# Patient Record
Sex: MALE | Race: WHITE | NOT HISPANIC OR LATINO
[De-identification: names, ages, dates, MRNs, and addresses within clinical notes are randomized per-mention and may not be internally consistent; named-entity substitution may affect disease eponyms.]

---

## 2022-11-22 ENCOUNTER — TRANSCRIBE ORDERS (OUTPATIENT)
Dept: OTHER | Age: 67
End: 2022-11-22

## 2022-11-22 DIAGNOSIS — D86.9 SARCOIDOSIS: Primary | ICD-10-CM

## 2022-12-02 DIAGNOSIS — J84.9 ILD (INTERSTITIAL LUNG DISEASE) (H): Primary | ICD-10-CM

## 2023-02-17 ENCOUNTER — LAB REQUISITION (OUTPATIENT)
Dept: LAB | Facility: CLINIC | Age: 68
End: 2023-02-17
Payer: COMMERCIAL

## 2023-02-17 LAB
PATH REPORT.COMMENTS IMP SPEC: NORMAL
PATH REPORT.FINAL DX SPEC: NORMAL
PATH REPORT.GROSS SPEC: NORMAL
PATH REPORT.RELEVANT HX SPEC: NORMAL
PATH REPORT.RELEVANT HX SPEC: NORMAL
PATH REPORT.SITE OF ORIGIN SPEC: NORMAL

## 2023-02-17 PROCEDURE — 88321 CONSLTJ&REPRT SLD PREP ELSWR: CPT | Performed by: PATHOLOGY

## 2023-02-22 ENCOUNTER — OFFICE VISIT (OUTPATIENT)
Dept: PULMONOLOGY | Facility: CLINIC | Age: 68
End: 2023-02-22
Attending: INTERNAL MEDICINE
Payer: COMMERCIAL

## 2023-02-22 ENCOUNTER — PATIENT OUTREACH (OUTPATIENT)
Dept: PULMONOLOGY | Facility: CLINIC | Age: 68
End: 2023-02-22

## 2023-02-22 ENCOUNTER — ANCILLARY PROCEDURE (OUTPATIENT)
Dept: CT IMAGING | Facility: CLINIC | Age: 68
End: 2023-02-22
Attending: INTERNAL MEDICINE
Payer: COMMERCIAL

## 2023-02-22 ENCOUNTER — LAB (OUTPATIENT)
Dept: LAB | Facility: CLINIC | Age: 68
End: 2023-02-22
Payer: COMMERCIAL

## 2023-02-22 VITALS
DIASTOLIC BLOOD PRESSURE: 80 MMHG | OXYGEN SATURATION: 97 % | HEART RATE: 88 BPM | HEIGHT: 73 IN | WEIGHT: 187 LBS | SYSTOLIC BLOOD PRESSURE: 135 MMHG | BODY MASS INDEX: 24.78 KG/M2

## 2023-02-22 DIAGNOSIS — J84.9 ILD (INTERSTITIAL LUNG DISEASE) (H): ICD-10-CM

## 2023-02-22 DIAGNOSIS — D86.9 SARCOIDOSIS: Primary | ICD-10-CM

## 2023-02-22 DIAGNOSIS — D86.9 SARCOIDOSIS: ICD-10-CM

## 2023-02-22 LAB
6 MIN WALK (FT): 1410 FT
6 MIN WALK (M): 430 M
ALBUMIN SERPL BCG-MCNC: 4.4 G/DL (ref 3.5–5.2)
ALP SERPL-CCNC: 113 U/L (ref 40–129)
ALT SERPL W P-5'-P-CCNC: 22 U/L (ref 10–50)
ANION GAP SERPL CALCULATED.3IONS-SCNC: 11 MMOL/L (ref 7–15)
AST SERPL W P-5'-P-CCNC: 26 U/L (ref 10–50)
BASOPHILS # BLD AUTO: 0.1 10E3/UL (ref 0–0.2)
BASOPHILS NFR BLD AUTO: 0 %
BILIRUB SERPL-MCNC: 0.7 MG/DL
BUN SERPL-MCNC: 15 MG/DL (ref 8–23)
CALCIUM SERPL-MCNC: 9.7 MG/DL (ref 8.8–10.2)
CHLORIDE SERPL-SCNC: 102 MMOL/L (ref 98–107)
CREAT SERPL-MCNC: 1.13 MG/DL (ref 0.67–1.17)
CRP SERPL-MCNC: 13.6 MG/L
DEPRECATED HCO3 PLAS-SCNC: 24 MMOL/L (ref 22–29)
EOSINOPHIL # BLD AUTO: 0 10E3/UL (ref 0–0.7)
EOSINOPHIL NFR BLD AUTO: 0 %
ERYTHROCYTE [DISTWIDTH] IN BLOOD BY AUTOMATED COUNT: 14.5 % (ref 10–15)
GFR SERPL CREATININE-BSD FRML MDRD: 72 ML/MIN/1.73M2
GLUCOSE SERPL-MCNC: 111 MG/DL (ref 70–99)
HCT VFR BLD AUTO: 41.2 % (ref 40–53)
HGB BLD-MCNC: 13.9 G/DL (ref 13.3–17.7)
IMM GRANULOCYTES # BLD: 0.1 10E3/UL
IMM GRANULOCYTES NFR BLD: 1 %
LYMPHOCYTES # BLD AUTO: 0.4 10E3/UL (ref 0.8–5.3)
LYMPHOCYTES NFR BLD AUTO: 3 %
MCH RBC QN AUTO: 29.1 PG (ref 26.5–33)
MCHC RBC AUTO-ENTMCNC: 33.7 G/DL (ref 31.5–36.5)
MCV RBC AUTO: 86 FL (ref 78–100)
MONOCYTES # BLD AUTO: 0.4 10E3/UL (ref 0–1.3)
MONOCYTES NFR BLD AUTO: 3 %
NEUTROPHILS # BLD AUTO: 10.5 10E3/UL (ref 1.6–8.3)
NEUTROPHILS NFR BLD AUTO: 93 %
NRBC # BLD AUTO: 0 10E3/UL
NRBC BLD AUTO-RTO: 0 /100
PLATELET # BLD AUTO: 288 10E3/UL (ref 150–450)
POTASSIUM SERPL-SCNC: 4.5 MMOL/L (ref 3.4–5.3)
PROT SERPL-MCNC: 7.9 G/DL (ref 6.4–8.3)
PTH-INTACT SERPL-MCNC: 62 PG/ML (ref 15–65)
RBC # BLD AUTO: 4.77 10E6/UL (ref 4.4–5.9)
SODIUM SERPL-SCNC: 137 MMOL/L (ref 136–145)
WBC # BLD AUTO: 11.3 10E3/UL (ref 4–11)

## 2023-02-22 PROCEDURE — 99213 OFFICE O/P EST LOW 20 MIN: CPT | Performed by: INTERNAL MEDICINE

## 2023-02-22 PROCEDURE — 85025 COMPLETE CBC W/AUTO DIFF WBC: CPT | Performed by: PATHOLOGY

## 2023-02-22 PROCEDURE — 86140 C-REACTIVE PROTEIN: CPT | Performed by: PATHOLOGY

## 2023-02-22 PROCEDURE — 94726 PLETHYSMOGRAPHY LUNG VOLUMES: CPT | Performed by: INTERNAL MEDICINE

## 2023-02-22 PROCEDURE — 94375 RESPIRATORY FLOW VOLUME LOOP: CPT | Performed by: INTERNAL MEDICINE

## 2023-02-22 PROCEDURE — 94729 DIFFUSING CAPACITY: CPT | Performed by: INTERNAL MEDICINE

## 2023-02-22 PROCEDURE — 80053 COMPREHEN METABOLIC PANEL: CPT | Performed by: PATHOLOGY

## 2023-02-22 PROCEDURE — 86481 TB AG RESPONSE T-CELL SUSP: CPT | Performed by: PATHOLOGY

## 2023-02-22 PROCEDURE — 86612 BLASTOMYCES ANTIBODY: CPT | Mod: 90 | Performed by: PATHOLOGY

## 2023-02-22 PROCEDURE — 87385 HISTOPLASMA CAPSUL AG IA: CPT | Mod: 90 | Performed by: PATHOLOGY

## 2023-02-22 PROCEDURE — 86635 COCCIDIOIDES ANTIBODY: CPT | Mod: 90 | Performed by: PATHOLOGY

## 2023-02-22 PROCEDURE — 99204 OFFICE O/P NEW MOD 45 MIN: CPT | Mod: 25 | Performed by: INTERNAL MEDICINE

## 2023-02-22 PROCEDURE — 86698 HISTOPLASMA ANTIBODY: CPT | Mod: 90 | Performed by: PATHOLOGY

## 2023-02-22 PROCEDURE — 82306 VITAMIN D 25 HYDROXY: CPT | Performed by: PATHOLOGY

## 2023-02-22 PROCEDURE — 83520 IMMUNOASSAY QUANT NOS NONAB: CPT | Mod: 90 | Performed by: PATHOLOGY

## 2023-02-22 PROCEDURE — 71250 CT THORAX DX C-: CPT | Performed by: RADIOLOGY

## 2023-02-22 PROCEDURE — 99000 SPECIMEN HANDLING OFFICE-LAB: CPT | Performed by: PATHOLOGY

## 2023-02-22 PROCEDURE — 94618 PULMONARY STRESS TESTING: CPT | Performed by: INTERNAL MEDICINE

## 2023-02-22 PROCEDURE — 82164 ANGIOTENSIN I ENZYME TEST: CPT | Mod: 90 | Performed by: PATHOLOGY

## 2023-02-22 PROCEDURE — G0463 HOSPITAL OUTPT CLINIC VISIT: HCPCS | Performed by: INTERNAL MEDICINE

## 2023-02-22 PROCEDURE — 86606 ASPERGILLUS ANTIBODY: CPT | Mod: 90 | Performed by: PATHOLOGY

## 2023-02-22 PROCEDURE — 83970 ASSAY OF PARATHORMONE: CPT | Performed by: PATHOLOGY

## 2023-02-22 PROCEDURE — 82652 VIT D 1 25-DIHYDROXY: CPT | Performed by: PATHOLOGY

## 2023-02-22 PROCEDURE — 36415 COLL VENOUS BLD VENIPUNCTURE: CPT | Performed by: PATHOLOGY

## 2023-02-22 PROCEDURE — 84433 ASY THIOPURIN S-MTHYLTRNSFRS: CPT | Mod: 90 | Performed by: PATHOLOGY

## 2023-02-22 PROCEDURE — 82784 ASSAY IGA/IGD/IGG/IGM EACH: CPT | Performed by: PATHOLOGY

## 2023-02-22 RX ORDER — TIOTROPIUM BROMIDE AND OLODATEROL 3.124; 2.736 UG/1; UG/1
SPRAY, METERED RESPIRATORY (INHALATION)
COMMUNITY
Start: 2023-02-17

## 2023-02-22 RX ORDER — FLUOXETINE 40 MG/1
CAPSULE ORAL
COMMUNITY
Start: 2023-02-11

## 2023-02-22 RX ORDER — ALBUTEROL SULFATE 90 UG/1
2 AEROSOL, METERED RESPIRATORY (INHALATION)
COMMUNITY
Start: 2021-06-29 | End: 2023-05-31

## 2023-02-22 RX ORDER — CALCIUM CARBONATE/VITAMIN D3 600 MG-10
1 TABLET ORAL DAILY
COMMUNITY

## 2023-02-22 RX ORDER — SIMVASTATIN 40 MG
1 TABLET ORAL
COMMUNITY
Start: 2023-02-11

## 2023-02-22 RX ORDER — PREDNISONE 5 MG/1
1 TABLET ORAL
COMMUNITY
Start: 2022-12-29 | End: 2023-09-27

## 2023-02-22 ASSESSMENT — PAIN SCALES - GENERAL: PAINLEVEL: NO PAIN (0)

## 2023-02-22 NOTE — LETTER
2/22/2023         RE: Jose Carlos Tello  [id number] [facility] [id number]        Dear Colleague,    Thank you for referring your patient, Jose Carlos Tello, to the [facility] [place]. Please see a copy of my visit note below.    Reason for Visit  Jose Carlos Tello is a 67 year old year old male who is being seen for sarcoidosis  Pulmonary HPI    The patient was seen and examined by Naveed Verde MD     Mr. Tello comes in for initial evaluation for sarcoidosis.  Around 07/2021, he started having worsening cough and decreasing endurance.  He also had shortness of breath.  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  Ultimately, he discussed this with his primary care physician and a workup was completed with chest x-ray and a CAT scan.  This was followed by a PET scan.  A bronchoscopy was done with EBUS-TBNA.  A diagnosis of sarcoidosis was made.  He had evaluations done by Cardiology, Urology, Nephrology and Ophthalmology at the initial diagnosis.  He was placed on prednisone 60 mg per day and this was slowly tapered.  Methotrexate was added at around 3 or so months back.  As he continues to have decreased endurance, other options for treatment were considered and this referral was placed.  He is currently on prednisone 5 mg per day and methotrexate 15 mg per week.    He tells me that he continues to have some shortness of breath with running, even at a slow pace.  He can walk on a level surface without any problems.  He can go up 1 flight of stairs without any significant shortness of breath but now, 2-3 flights can bother him.  The cough which was present initially has significantly improved.  He reports that he had more energy when he was on prednisone 60 mg per day but does not remember if his endurance was significantly better.    Of note,  he has right bundle branch block and he was evaluated by Cardiology.  No further testing was done.    PAST MEDICAL HISTORY:  Includes sarcoidosis, hypercholesterolemia and anxiety.    CURRENT MEDICATIONS:  Include:    1.  Prednisone 5 mg per day.    2.  Methotrexate 15 mg per week.    3.  MVI.    4.  Calcium/vitamin D.   5.  Fluoxetine.    6.  Albuterol inhaler.    7.  He is also on tiotropium inhaler.    SOCIAL HISTORY:  He worked in a [employer] and was exposed to solvents and other chemicals for 40 years.  Currently, retired.  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  He has no pets or birds at home.  He does not use a hot tub.  He is a lifelong nonsmoker and had occasional alcohol which he has stopped taking since he was started on methotrexate.  No exposure to TB or asbestos.    He is of Polish/[place]atian/Maced[place] ancestry.  He has 10 siblings, none of whom have sarcoidosis.  His children are healthy without any lung disease or sarcoidosis.      ROS Pulmonary    A complete ROS was otherwise negative except as noted in the HPI.  There were no vitals taken for this visit.  Exam:   GENERAL APPEARANCE: Well developed, well nourished, alert, and in no apparent distress.  LYMPHATICS: No significant axillary, cervical, or supraclavicular nodes.  RESP: normal percussion, good air flow throughout.  No crackles. No rhonchi. No wheezes.  CV: Normal S1, S2, regular rhythm, normal rate. No murmur.  No rub. No gallop. No LE edema.   MS: extremities normal. No clubbing. No cyanosis.  SKIN: no rash on limited exam  NEURO: Mentation intact, speech normal, normal strength and tone, normal gait and stance    Results:  Chest CT: Perihilar soft tissue thickening/dense fibrosis/conglomerate partially calcified lymph nodes grossly unchanged with numerous small sub-5 mm nodules peripheral to the perihilar fullness areas along with right middle lobe consolidation with narrowing and abrupt termination again noted in the  right middle lobe bronchus. These findings are similar to previous consistent with sarcoidosis.    PFTs done today were reviewed by me with the patient.  A 6 minute walk test was also done, which showed a decreased 6 minute walk distance at 1410 feet (lower limit of normal equal to 1632 feet).  O2 saturation at the beginning of the walk was 100%, lowest O2 saturation 92%.  FVC is 5.16 (113%), Z score 0.95.  FEV1 is 3.52 (101%), Z score 0.11.  The ratio is 68, and FEF 25-75 is 2.28 L (83%), Z score negative 0.43.  Total lung capacity is 8.50 (109%), Z score 1.02.  Residual volume is 3.06 (115%), and Z score 0.99.  DLCO not corrected for hemoglobin is 23.08 (78%), negative 1.34.      My interpretation is that he has exercise limitation based on 6 minute walk distance and 8 point drop in O2 saturation with activity.  Suggestion of obstruction and mild air trapping, but otherwise spirometry and lung volumes are normal.  The diffusion capacity not corrected for hemoglobin is mildly decreased.    Note- on Biopsies obtained on 7/13/2021, no granulomas were identified on cellblock to performs AFB, GMS and aurimine stains.      Assessment and plan: A 67-year-old male of Polish/British/Romansh descent with no family history of sarcoidosis, exposed to solvents for 40 years at a [employer] with initial symptoms in 2021, ultimately leading to EBUS-TBNA that showed features suggestive of granuloma.  Fungal and AFB stains not done on that tissue.  He was treated with prednisone and methotrexate.  He is here for further evaluation and management.    1.  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  There also is concern for airway involvement.  It is unclear if he had a clear benefit  with higher levels of immunosuppression in the pas. He reports better ennergy but not sure if  his endurance was better.  The options for pulmonary sarcoidosis include a trial with TNF blockade for a finite amount of time or  consider a clinical study.  Because he continues to have lower endurance, other factors that may be contributing need to be considered. He could have pulmonary hypertension for which we will get an echo with evaluation of RVSP. This will include a bubble study. He could certainly have a significant component of airway involvement. This would be hard to resolve and treatment could include inhaled agents, as he is on. Stenting would probably not be an option in a benign condition.  However, dilatation could be considered. We will review his case in Radiology meeting to see if we should have direct visualization by IP to consider dilatation.    2.  Extrapulmonary sarcoidosis:  Has a history of right bundle branch block.  He has not had any advanced cardiac imaging study.  A PET scan would be reasonable because it will show uptake in other areas also to indicate activity of his sarcoidosis.  We will order one.  We will check LFTs, calcium, vitamin D, PTH and dihydroxy vitamin D.  We will check CBC.  He has had an eye exam and no ocular inflammation was present.    He is currently not on PJP prophylaxis which could be considered given that he is on methotrexate and prednisone.    I will see him back in 3 months and will review the treatment options with him, in the meantime, over the phone.       Addendum: Echo and cardiac PET scan scheduled for 3/13/2023 and 3/15/23 respectively.  Labs reviewed.  Electrolyte panel in normal range.  Calcium is 9.7, vitamin D 26,, 125 dihydroxy vitamin D 41.9.  CRP is 13.6.  LFTs are in the normal range.  WBC count 11.3 with 93% neutrophils.  Hemoglobin is 13.9 and platelet count is 288..  Fungal antibodies are negative.  QuantiFERON gold TB test is negative.  IgG levels are in the normal range.  PTH is in the normal range.  Histo antigen is negative.  Soluble IL-2 R is in the normal range history of cocci antibodies are in the normal range.    We are waiting for the echo and the PET  study.  Based on the finding of the studies and review of his case during MDD we will need to decide if direct evaluation for possible medication of the lobar airways is the next versus reinitiating systemic anti-inflammatory therapy should be considered.      Again, thank you for allowing me to participate in the care of your patient.        Sincerely,        Naveed Verde MD

## 2023-02-22 NOTE — NURSING NOTE
Chief Complaint   Patient presents with     New Patient     New ILD      Vitals were taken and medications were reconciled.     Maggie Monique RMA  10:56 AM

## 2023-02-22 NOTE — PROGRESS NOTES
Spoke with patient after new consult visit with provider to introduce role as nurse care coordinator and provide direct phone number for future questions or concerns related to their care received in ILD/Sarcoidosis Clinic. Also provided contact information for ILD/Sarcoidosis Clinic Navigator for use related to scheduling needs

## 2023-02-22 NOTE — PROGRESS NOTES
Reason for Visit  Jose Carlos Tello is a 67 year old year old male who is being seen for sarcoidosis  Pulmonary HPI    The patient was seen and examined by Naveed Verde MD     Mr. Tello comes in for initial evaluation for sarcoidosis.  Around 07/2021, he started having worsening cough and decreasing endurance.  He also had shortness of breath.  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  Ultimately, he discussed this with his primary care physician and a workup was completed with chest x-ray and a CAT scan.  This was followed by a PET scan.  A bronchoscopy was done with EBUS-TBNA.  A diagnosis of sarcoidosis was made.  He had evaluations done by Cardiology, Urology, Nephrology and Ophthalmology at the initial diagnosis.  He was placed on prednisone 60 mg per day and this was slowly tapered.  Methotrexate was added at around 3 or so months back.  As he continues to have decreased endurance, other options for treatment were considered and this referral was placed.  He is currently on prednisone 5 mg per day and methotrexate 15 mg per week.    He tells me that he continues to have some shortness of breath with running, even at a slow pace.  He can walk on a level surface without any problems.  He can go up 1 flight of stairs without any significant shortness of breath but now, 2-3 flights can bother him.  The cough which was present initially has significantly improved.  He reports that he had more energy when he was on prednisone 60 mg per day but does not remember if his endurance was significantly better.    Of note, he has right bundle branch block and he was evaluated by Cardiology.  No further testing was done.    PAST MEDICAL HISTORY:  Includes sarcoidosis, hypercholesterolemia and anxiety.    CURRENT MEDICATIONS:  Include:    1.  Prednisone 5 mg per day.    2.  Methotrexate 15 mg per week.    3.  MVI.     4.  Calcium/vitamin D.   5.  Fluoxetine.    6.  Albuterol inhaler.    7.  He is also on tiotropium inhaler.    SOCIAL HISTORY:  He worked in a [employer] and was exposed to solvents and other chemicals for 40 years.  Currently, retired.  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  He has no pets or birds at home.  He does not use a hot tub.  He is a lifelong nonsmoker and had occasional alcohol which he has stopped taking since he was started on methotrexate.  No exposure to TB or asbestos.    He is of Polish/Frisian/[place] ancestry.  He has 10 siblings, none of whom have sarcoidosis.  His children are healthy without any lung disease or sarcoidosis.      ROS Pulmonary    A complete ROS was otherwise negative except as noted in the HPI.  There were no vitals taken for this visit.  Exam:   GENERAL APPEARANCE: Well developed, well nourished, alert, and in no apparent distress.  LYMPHATICS: No significant axillary, cervical, or supraclavicular nodes.  RESP: normal percussion, good air flow throughout.  No crackles. No rhonchi. No wheezes.  CV: Normal S1, S2, regular rhythm, normal rate. No murmur.  No rub. No gallop. No LE edema.   MS: extremities normal. No clubbing. No cyanosis.  SKIN: no rash on limited exam  NEURO: Mentation intact, speech normal, normal strength and tone, normal gait and stance    Results:  Chest CT: Perihilar soft tissue thickening/dense fibrosis/conglomerate partially calcified lymph nodes grossly unchanged with numerous small sub-5 mm nodules peripheral to the perihilar fullness areas along with right middle lobe consolidation with narrowing and abrupt termination again noted in the right middle lobe bronchus. These findings are similar to previous consistent with sarcoidosis.    PFTs done today were reviewed by me with the patient.  A 6 minute walk test was also done, which showed a decreased 6 minute walk distance at 1410 feet (lower limit of normal equal to 1632 feet).  O2  saturation at the beginning of the walk was 100%, lowest O2 saturation 92%.  FVC is 5.16 (113%), Z score 0.95.  FEV1 is 3.52 (101%), Z score 0.11.  The ratio is 68, and FEF 25-75 is 2.28 L (83%), Z score negative 0.43.  Total lung capacity is 8.50 (109%), Z score 1.02.  Residual volume is 3.06 (115%), and Z score 0.99.  DLCO not corrected for hemoglobin is 23.08 (78%), negative 1.34.      My interpretation is that he has exercise limitation based on 6 minute walk distance and 8 point drop in O2 saturation with activity.  Suggestion of obstruction and mild air trapping, but otherwise spirometry and lung volumes are normal.  The diffusion capacity not corrected for hemoglobin is mildly decreased.    Note- on Biopsies obtained on 7/13/2021, no granulomas were identified on cellblock to performs AFB, GMS and aurimine stains.      Assessment and plan: A 67-year-old male of Polish/Korean/Japanese descent with no family history of sarcoidosis, exposed to solvents for 40 years at a [employer] with initial symptoms in 2021, ultimately leading to EBUS-TBNA that showed features suggestive of granuloma.  Fungal and AFB stains not done on that tissue.  He was treated with prednisone and methotrexate.  He is here for further evaluation and management.    1.  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  There also is concern for airway involvement.  It is unclear if he had a clear benefit  with higher levels of immunosuppression in the pas. He reports better ennergy but not sure if  his endurance was better.  The options for pulmonary sarcoidosis include a trial with TNF blockade for a finite amount of time or consider a clinical study.  Because he continues to have lower endurance, other factors that may be contributing need to be considered. He could have pulmonary hypertension for which we will get an echo with evaluation of RVSP. This will include a bubble study. He could certainly have a significant  component of airway involvement. This would be hard to resolve and treatment could include inhaled agents, as he is on. Stenting would probably not be an option in a benign condition.  However, dilatation could be considered. We will review his case in Radiology meeting to see if we should have direct visualization by IP to consider dilatation.    2.  Extrapulmonary sarcoidosis:  Has a history of right bundle branch block.  He has not had any advanced cardiac imaging study.  A PET scan would be reasonable because it will show uptake in other areas also to indicate activity of his sarcoidosis.  We will order one.  We will check LFTs, calcium, vitamin D, PTH and dihydroxy vitamin D.  We will check CBC.  He has had an eye exam and no ocular inflammation was present.    He is currently not on PJP prophylaxis which could be considered given that he is on methotrexate and prednisone.    I will see him back in 3 months and will review the treatment options with him, in the meantime, over the phone.       Addendum: Echo and cardiac PET scan scheduled for 3/13/2023 and 3/15/23 respectively.  Labs reviewed.  Electrolyte panel in normal range.  Calcium is 9.7, vitamin D 26,, 125 dihydroxy vitamin D 41.9.  CRP is 13.6.  LFTs are in the normal range.  WBC count 11.3 with 93% neutrophils.  Hemoglobin is 13.9 and platelet count is 288..  Fungal antibodies are negative.  QuantiFERON gold TB test is negative.  IgG levels are in the normal range.  PTH is in the normal range.  Histo antigen is negative.  Soluble IL-2 R is in the normal range history of cocci antibodies are in the normal range.    We are waiting for the echo and the PET study.  Based on the finding of the studies and review of his case during MDD we will need to decide if direct evaluation for possible medication of the lobar airways is the next versus reinitiating systemic anti-inflammatory therapy should be considered.

## 2023-02-23 LAB
1,25(OH)2D SERPL-MCNC: 41.9 PG/ML (ref 19.9–79.3)
DEPRECATED CALCIDIOL+CALCIFEROL SERPL-MC: 26 UG/L (ref 20–75)
DLCOUNC-%PRED-PRE: 78 %
DLCOUNC-PRE: 23.08 ML/MIN/MMHG
DLCOUNC-PRED: 29.32 ML/MIN/MMHG
ERV-%PRED-PRE: 65 %
ERV-PRE: 1 L
ERV-PRED: 1.53 L
EXPTIME-PRE: 7.18 SEC
FEF2575-%PRED-PRE: 83 %
FEF2575-PRE: 2.28 L/SEC
FEF2575-PRED: 2.72 L/SEC
FEFMAX-%PRED-PRE: 82 %
FEFMAX-PRE: 7.81 L/SEC
FEFMAX-PRED: 9.49 L/SEC
FEV1-%PRED-PRE: 101 %
FEV1-PRE: 3.52 L
FEV1FEV6-PRE: 70 %
FEV1FEV6-PRED: 78 %
FEV1FVC-PRE: 68 %
FEV1FVC-PRED: 77 %
FEV1SVC-PRE: 65 %
FEV1SVC-PRED: 64 %
FIFMAX-PRE: 3.87 L/SEC
FRCPLETH-%PRED-PRE: 105 %
FRCPLETH-PRE: 4.07 L
FRCPLETH-PRED: 3.86 L
FVC-%PRED-PRE: 113 %
FVC-PRE: 5.16 L
FVC-PRED: 4.53 L
GAMMA INTERFERON BACKGROUND BLD IA-ACNC: 0.01 IU/ML
IC-%PRED-PRE: 113 %
IC-PRE: 4.44 L
IC-PRED: 3.9 L
IGG SERPL-MCNC: 1270 MG/DL (ref 610–1616)
M TB IFN-G BLD-IMP: NEGATIVE
M TB IFN-G CD4+ BCKGRND COR BLD-ACNC: 1.6 IU/ML
MITOGEN IGNF BCKGRD COR BLD-ACNC: 0 IU/ML
MITOGEN IGNF BCKGRD COR BLD-ACNC: 0 IU/ML
QUANTIFERON MITOGEN: 1.61 IU/ML
QUANTIFERON NIL TUBE: 0.01 IU/ML
QUANTIFERON TB1 TUBE: 0.01 IU/ML
QUANTIFERON TB2 TUBE: 0.01
RVPLETH-%PRED-PRE: 115 %
RVPLETH-PRE: 3.06 L
RVPLETH-PRED: 2.66 L
TLCPLETH-%PRED-PRE: 109 %
TLCPLETH-PRE: 8.5 L
TLCPLETH-PRED: 7.79 L
VA-%PRED-PRE: 105 %
VA-PRE: 7.64 L
VC-%PRED-PRE: 100 %
VC-PRE: 5.44 L
VC-PRED: 5.42 L

## 2023-02-24 LAB
ACE SERPL-CCNC: 47 U/L
SCANNED LAB RESULT: NORMAL

## 2023-02-25 LAB
ASPERGILLUS AB TITR SER CF: NORMAL {TITER}
B DERMAT AB SER-ACNC: 0.6 IV
COCCIDIOIDES AB TITR SER CF: NORMAL {TITER}
H CAPSUL MYC AB TITR SER CF: NORMAL {TITER}
H CAPSUL YST AB TITR SER CF: NORMAL {TITER}
TPMT BLD-CCNC: 32 U/ML

## 2023-03-01 LAB — SCANNED LAB RESULT: NORMAL

## 2023-03-13 ENCOUNTER — ANCILLARY PROCEDURE (OUTPATIENT)
Dept: CARDIOLOGY | Facility: CLINIC | Age: 68
End: 2023-03-13
Attending: INTERNAL MEDICINE
Payer: COMMERCIAL

## 2023-03-13 LAB — LVEF ECHO: NORMAL

## 2023-03-13 PROCEDURE — 93306 TTE W/DOPPLER COMPLETE: CPT | Performed by: STUDENT IN AN ORGANIZED HEALTH CARE EDUCATION/TRAINING PROGRAM

## 2023-03-14 ENCOUNTER — TEAM CONFERENCE (OUTPATIENT)
Dept: PULMONOLOGY | Facility: CLINIC | Age: 68
End: 2023-03-14
Payer: COMMERCIAL

## 2023-03-14 DIAGNOSIS — D86.9 SARCOIDOSIS: Primary | ICD-10-CM

## 2023-03-14 NOTE — CONFIDENTIAL NOTE
Sarcoid Multidisciplinary Conference      Patient name: Jose Carlos Tello    Physician: Naveed Verde MD (pulm)    Question for multidisciplinary group: Pt Seeking second opinion. Significant airway involvement? Review bx to determine if consistent with sarcoid. Imaging showing better or worse disease?    Radiology interpretation: 2/2023 CT scan: Significant airway narrowing. RUL and RLL are the worst. PET Imaging was worse in 6/2021 in comparison to 2/23 CT. No dramatic change in last 2 years. Consistent with sarcoidosis. Jose Carlos Naylor MD    Other testing reviewed: EBUS/TBNA: Lymph node path shows tiny granulomas; too small to diagnose. Due to limited tissue sampling, biopsy is inconclusive. Mell Hope MD    Plan: 2nd PET will be done 3/15/23.  Echo shows RVS abnormal at 30. Will follow clinically, possible right heart cath in the future.     3/16/23: Spoke with pt and per Dr. Verde pt being advised to start Infliximab. Pt states he is willing to start. Orders placed to secure finance approval. Awaiting response from Dr. Obando on airways dilatation. Awaiting response from Dr. Villalpando in relation to PET showing global reduced perfusion.     Alissa Mascorro RN

## 2023-03-15 ENCOUNTER — ANCILLARY PROCEDURE (OUTPATIENT)
Dept: PET IMAGING | Facility: CLINIC | Age: 68
End: 2023-03-15
Attending: INTERNAL MEDICINE

## 2023-03-15 DIAGNOSIS — D86.9 SARCOIDOSIS: ICD-10-CM

## 2023-03-15 LAB — GLUCOSE SERPL-MCNC: 95 MG/DL (ref 70–99)

## 2023-03-16 DIAGNOSIS — D86.9 SARCOIDOSIS: ICD-10-CM

## 2023-03-16 RX ORDER — MEPERIDINE HYDROCHLORIDE 25 MG/ML
25 INJECTION INTRAMUSCULAR; INTRAVENOUS; SUBCUTANEOUS EVERY 30 MIN PRN
Status: CANCELLED | OUTPATIENT
Start: 2023-03-16

## 2023-03-16 RX ORDER — ACETAMINOPHEN 325 MG/1
650 TABLET ORAL ONCE
Status: CANCELLED
Start: 2023-03-16 | End: 2023-03-16

## 2023-03-16 RX ORDER — HEPARIN SODIUM (PORCINE) LOCK FLUSH IV SOLN 100 UNIT/ML 100 UNIT/ML
5 SOLUTION INTRAVENOUS
Status: CANCELLED | OUTPATIENT
Start: 2023-03-16

## 2023-03-16 RX ORDER — EPINEPHRINE 1 MG/ML
0.3 INJECTION, SOLUTION, CONCENTRATE INTRAVENOUS EVERY 5 MIN PRN
Status: CANCELLED | OUTPATIENT
Start: 2023-03-16

## 2023-03-16 RX ORDER — DIPHENHYDRAMINE HYDROCHLORIDE 50 MG/ML
50 INJECTION INTRAMUSCULAR; INTRAVENOUS
Status: CANCELLED
Start: 2023-03-16

## 2023-03-16 RX ORDER — HEPARIN SODIUM,PORCINE 10 UNIT/ML
5 VIAL (ML) INTRAVENOUS
Status: CANCELLED | OUTPATIENT
Start: 2023-03-16

## 2023-03-16 RX ORDER — ALBUTEROL SULFATE 0.83 MG/ML
2.5 SOLUTION RESPIRATORY (INHALATION)
Status: CANCELLED | OUTPATIENT
Start: 2023-03-16

## 2023-03-16 RX ORDER — METHYLPREDNISOLONE SODIUM SUCCINATE 125 MG/2ML
125 INJECTION, POWDER, LYOPHILIZED, FOR SOLUTION INTRAMUSCULAR; INTRAVENOUS ONCE
Status: CANCELLED | OUTPATIENT
Start: 2023-03-16 | End: 2023-03-16

## 2023-03-16 RX ORDER — DIPHENHYDRAMINE HCL 25 MG
25 CAPSULE ORAL ONCE
Status: CANCELLED
Start: 2023-03-16 | End: 2023-03-16

## 2023-03-16 RX ORDER — METHYLPREDNISOLONE SODIUM SUCCINATE 125 MG/2ML
125 INJECTION, POWDER, LYOPHILIZED, FOR SOLUTION INTRAMUSCULAR; INTRAVENOUS
Status: CANCELLED
Start: 2023-03-16

## 2023-03-16 RX ORDER — ALBUTEROL SULFATE 90 UG/1
1-2 AEROSOL, METERED RESPIRATORY (INHALATION)
Status: CANCELLED
Start: 2023-03-16

## 2023-03-19 ENCOUNTER — TELEPHONE (OUTPATIENT)
Dept: PULMONOLOGY | Facility: CLINIC | Age: 68
End: 2023-03-19
Payer: COMMERCIAL

## 2023-03-23 ENCOUNTER — MYC MEDICAL ADVICE (OUTPATIENT)
Dept: PULMONOLOGY | Facility: CLINIC | Age: 68
End: 2023-03-23
Payer: COMMERCIAL

## 2023-03-27 ENCOUNTER — DOCUMENTATION ONLY (OUTPATIENT)
Dept: LAB | Facility: CLINIC | Age: 68
End: 2023-03-27
Payer: COMMERCIAL

## 2023-03-31 ENCOUNTER — LAB (OUTPATIENT)
Dept: LAB | Facility: CLINIC | Age: 68
End: 2023-03-31
Payer: COMMERCIAL

## 2023-03-31 DIAGNOSIS — D86.9 SARCOIDOSIS: ICD-10-CM

## 2023-03-31 PROCEDURE — 36415 COLL VENOUS BLD VENIPUNCTURE: CPT

## 2023-03-31 PROCEDURE — 86704 HEP B CORE ANTIBODY TOTAL: CPT

## 2023-03-31 PROCEDURE — 87340 HEPATITIS B SURFACE AG IA: CPT

## 2023-04-03 LAB
HBV CORE AB SERPL QL IA: NONREACTIVE
HBV SURFACE AG SERPL QL IA: NONREACTIVE

## 2023-04-11 ENCOUNTER — TEAM CONFERENCE (OUTPATIENT)
Dept: PULMONOLOGY | Facility: CLINIC | Age: 68
End: 2023-04-11
Payer: COMMERCIAL

## 2023-04-11 NOTE — CONFIDENTIAL NOTE
Sarcoid Multidisciplinary Conference      Patient name: Jose Carlos STORY Elisha    Physician: Naveed Verde MD (pulm)    Question for multidisciplinary group:  PET scan review    Radiology interpretation: Active pulmonary sarcoidosis. No cardiac involvement.  Jose Carlos Naylor MD    Plan: Infliximab on hold. Clinic trial screening.

## 2023-04-16 ENCOUNTER — HEALTH MAINTENANCE LETTER (OUTPATIENT)
Age: 68
End: 2023-04-16

## 2023-04-25 ENCOUNTER — TELEPHONE (OUTPATIENT)
Dept: PULMONOLOGY | Facility: CLINIC | Age: 68
End: 2023-04-25
Payer: COMMERCIAL

## 2023-04-25 DIAGNOSIS — D86.9 SARCOIDOSIS: Primary | ICD-10-CM

## 2023-05-15 ENCOUNTER — HOSPITAL ENCOUNTER (OUTPATIENT)
Dept: RESEARCH | Facility: CLINIC | Age: 68
Discharge: HOME | End: 2023-05-15
Attending: INTERNAL MEDICINE

## 2023-05-15 ENCOUNTER — HOSPITAL ENCOUNTER (OUTPATIENT)
Dept: PET IMAGING | Facility: CLINIC | Age: 68
Discharge: HOME | End: 2023-05-15
Attending: INTERNAL MEDICINE

## 2023-05-15 DIAGNOSIS — D86.9 SARCOIDOSIS: ICD-10-CM

## 2023-05-15 PROCEDURE — 510N000009 HC RESEARCH FACILITY, PER 15 MIN

## 2023-05-15 PROCEDURE — 94729 DIFFUSING CAPACITY: CPT | Performed by: INTERNAL MEDICINE

## 2023-05-15 PROCEDURE — 510N000017 HC CRU PATIENT CARE, PER 15 MIN

## 2023-05-15 PROCEDURE — 300N000010 HC RESEARCH B&I SPECIMEN PACKAGING, COMPLEX

## 2023-05-15 PROCEDURE — 78815 PET IMAGE W/CT SKULL-THIGH: CPT | Mod: PS

## 2023-05-15 PROCEDURE — 343N000001 HC RX 343: Performed by: INTERNAL MEDICINE

## 2023-05-15 PROCEDURE — 300N000007 HC RESEARCH B&I SPECIMEN PROCESSING, SIMPLE

## 2023-05-15 PROCEDURE — 78815 PET IMAGE W/CT SKULL-THIGH: CPT | Mod: 26 | Performed by: RADIOLOGY

## 2023-05-15 PROCEDURE — A9552 F18 FDG: HCPCS | Performed by: INTERNAL MEDICINE

## 2023-05-15 PROCEDURE — 94375 RESPIRATORY FLOW VOLUME LOOP: CPT | Performed by: INTERNAL MEDICINE

## 2023-05-15 RX ADMIN — FLUDEOXYGLUCOSE F-18 11.05 MILLICURIE: 500 INJECTION, SOLUTION INTRAVENOUS at 13:15

## 2023-05-16 PROCEDURE — 300N000007 HC RESEARCH B&I SPECIMEN PROCESSING, SIMPLE

## 2023-05-16 PROCEDURE — 300N000010 HC RESEARCH B&I SPECIMEN PACKAGING, COMPLEX

## 2023-05-17 LAB
DLCOUNC-%PRED-PRE: 65 %
DLCOUNC-PRE: 18.03 ML/MIN/MMHG
DLCOUNC-PRED: 27.53 ML/MIN/MMHG
ERV-%PRED-PRE: 116 %
ERV-PRE: 1.48 L
ERV-PRED: 1.28 L
EXPTIME-PRE: 13 SEC
FEF2575-%PRED-PRE: 63 %
FEF2575-PRE: 1.64 L/SEC
FEF2575-PRED: 2.59 L/SEC
FEFMAX-%PRED-PRE: 90 %
FEFMAX-PRE: 8.2 L/SEC
FEFMAX-PRED: 9.02 L/SEC
FEV1-%PRED-PRE: 101 %
FEV1-PRE: 3.3 L
FEV1FEV6-PRE: 67 %
FEV1FEV6-PRED: 78 %
FEV1FVC-PRE: 62 %
FEV1FVC-PRED: 77 %
FEV1SVC-PRE: 64 %
FEV1SVC-PRED: 64 %
FIFMAX-PRE: 5.65 L/SEC
FVC-%PRED-PRE: 125 %
FVC-PRE: 5.33 L
FVC-PRED: 4.24 L
IC-%PRED-PRE: 97 %
IC-PRE: 3.7 L
IC-PRED: 3.8 L
VA-%PRED-PRE: 101 %
VA-PRE: 6.88 L
VC-%PRED-PRE: 102 %
VC-PRE: 5.19 L
VC-PRED: 5.07 L

## 2023-05-23 NOTE — ADDENDUM NOTE
Encounter addended by: Lizabeth Fermin RN on: 5/23/2023 1:55 PM   Actions taken: Charge Capture section accepted

## 2023-05-30 ASSESSMENT — ENCOUNTER SYMPTOMS
HEMOPTYSIS: 0
POSTURAL DYSPNEA: 0
COUGH: 1
SHORTNESS OF BREATH: 0
SPUTUM PRODUCTION: 1
DYSPNEA ON EXERTION: 1
WHEEZING: 0
SNORES LOUDLY: 0
COUGH DISTURBING SLEEP: 0

## 2023-05-31 ENCOUNTER — LAB (OUTPATIENT)
Dept: LAB | Facility: CLINIC | Age: 68
End: 2023-05-31
Payer: COMMERCIAL

## 2023-05-31 ENCOUNTER — OFFICE VISIT (OUTPATIENT)
Dept: PULMONOLOGY | Facility: CLINIC | Age: 68
End: 2023-05-31
Attending: INTERNAL MEDICINE
Payer: COMMERCIAL

## 2023-05-31 VITALS
HEART RATE: 70 BPM | OXYGEN SATURATION: 97 % | DIASTOLIC BLOOD PRESSURE: 77 MMHG | WEIGHT: 191 LBS | RESPIRATION RATE: 17 BRPM | SYSTOLIC BLOOD PRESSURE: 133 MMHG | BODY MASS INDEX: 26.74 KG/M2 | HEIGHT: 71 IN

## 2023-05-31 DIAGNOSIS — D86.85 CARDIAC SARCOIDOSIS: ICD-10-CM

## 2023-05-31 DIAGNOSIS — D86.9 SARCOIDOSIS: ICD-10-CM

## 2023-05-31 DIAGNOSIS — D86.85 CARDIAC SARCOIDOSIS: Primary | ICD-10-CM

## 2023-05-31 LAB
6 MIN WALK (FT): 1600 FT
6 MIN WALK (M): 488 M
ALBUMIN SERPL BCG-MCNC: 4.3 G/DL (ref 3.5–5.2)
ALP SERPL-CCNC: 95 U/L (ref 40–129)
ALT SERPL W P-5'-P-CCNC: 16 U/L (ref 10–50)
ANION GAP SERPL CALCULATED.3IONS-SCNC: 8 MMOL/L (ref 7–15)
AST SERPL W P-5'-P-CCNC: 23 U/L (ref 10–50)
BASOPHILS # BLD AUTO: 0.1 10E3/UL (ref 0–0.2)
BASOPHILS NFR BLD AUTO: 1 %
BILIRUB DIRECT SERPL-MCNC: 0.28 MG/DL (ref 0–0.3)
BILIRUB SERPL-MCNC: 0.8 MG/DL
BUN SERPL-MCNC: 20.9 MG/DL (ref 8–23)
CALCIUM SERPL-MCNC: 9.5 MG/DL (ref 8.8–10.2)
CHLORIDE SERPL-SCNC: 104 MMOL/L (ref 98–107)
CREAT SERPL-MCNC: 1.16 MG/DL (ref 0.67–1.17)
DEPRECATED HCO3 PLAS-SCNC: 25 MMOL/L (ref 22–29)
EOSINOPHIL # BLD AUTO: 0.1 10E3/UL (ref 0–0.7)
EOSINOPHIL NFR BLD AUTO: 2 %
ERYTHROCYTE [DISTWIDTH] IN BLOOD BY AUTOMATED COUNT: 13.7 % (ref 10–15)
GFR SERPL CREATININE-BSD FRML MDRD: 69 ML/MIN/1.73M2
GLUCOSE SERPL-MCNC: 101 MG/DL (ref 70–99)
HCT VFR BLD AUTO: 40.9 % (ref 40–53)
HGB BLD-MCNC: 13.8 G/DL (ref 13.3–17.7)
IMM GRANULOCYTES # BLD: 0 10E3/UL
IMM GRANULOCYTES NFR BLD: 0 %
LYMPHOCYTES # BLD AUTO: 0.5 10E3/UL (ref 0.8–5.3)
LYMPHOCYTES NFR BLD AUTO: 7 %
MCH RBC QN AUTO: 30 PG (ref 26.5–33)
MCHC RBC AUTO-ENTMCNC: 33.7 G/DL (ref 31.5–36.5)
MCV RBC AUTO: 89 FL (ref 78–100)
MONOCYTES # BLD AUTO: 0.6 10E3/UL (ref 0–1.3)
MONOCYTES NFR BLD AUTO: 8 %
NEUTROPHILS # BLD AUTO: 6.1 10E3/UL (ref 1.6–8.3)
NEUTROPHILS NFR BLD AUTO: 82 %
NRBC # BLD AUTO: 0 10E3/UL
NRBC BLD AUTO-RTO: 0 /100
PLATELET # BLD AUTO: 232 10E3/UL (ref 150–450)
POTASSIUM SERPL-SCNC: 4.2 MMOL/L (ref 3.4–5.3)
PROT SERPL-MCNC: 7.4 G/DL (ref 6.4–8.3)
RBC # BLD AUTO: 4.6 10E6/UL (ref 4.4–5.9)
SODIUM SERPL-SCNC: 137 MMOL/L (ref 136–145)
WBC # BLD AUTO: 7.5 10E3/UL (ref 4–11)

## 2023-05-31 PROCEDURE — 94729 DIFFUSING CAPACITY: CPT | Performed by: INTERNAL MEDICINE

## 2023-05-31 PROCEDURE — 99214 OFFICE O/P EST MOD 30 MIN: CPT | Mod: 25 | Performed by: INTERNAL MEDICINE

## 2023-05-31 PROCEDURE — 83520 IMMUNOASSAY QUANT NOS NONAB: CPT | Mod: 90 | Performed by: PATHOLOGY

## 2023-05-31 PROCEDURE — 99000 SPECIMEN HANDLING OFFICE-LAB: CPT | Performed by: PATHOLOGY

## 2023-05-31 PROCEDURE — 36415 COLL VENOUS BLD VENIPUNCTURE: CPT | Performed by: PATHOLOGY

## 2023-05-31 PROCEDURE — G0463 HOSPITAL OUTPT CLINIC VISIT: HCPCS | Performed by: INTERNAL MEDICINE

## 2023-05-31 PROCEDURE — 94618 PULMONARY STRESS TESTING: CPT | Performed by: INTERNAL MEDICINE

## 2023-05-31 PROCEDURE — 80053 COMPREHEN METABOLIC PANEL: CPT | Performed by: PATHOLOGY

## 2023-05-31 PROCEDURE — 94375 RESPIRATORY FLOW VOLUME LOOP: CPT | Performed by: INTERNAL MEDICINE

## 2023-05-31 PROCEDURE — 82248 BILIRUBIN DIRECT: CPT | Performed by: PATHOLOGY

## 2023-05-31 PROCEDURE — 85025 COMPLETE CBC W/AUTO DIFF WBC: CPT | Performed by: PATHOLOGY

## 2023-05-31 PROCEDURE — 82164 ANGIOTENSIN I ENZYME TEST: CPT | Mod: 90 | Performed by: PATHOLOGY

## 2023-05-31 ASSESSMENT — PAIN SCALES - GENERAL: PAINLEVEL: NO PAIN (0)

## 2023-05-31 NOTE — PROGRESS NOTES
Reason for Visit  Jose Carlos Tello is a 67 year old year old male who is being seen for Sarcoidosis  Pulmonary HPI    The patient was seen and examined by Naveed Verde MD     Mr. Tello comes in for followup of his pulmonary sarcoidosis.  He was last seen in the Pulmonary Clinic in 02/2023.  At that time, the plan was to review his case in MDD and decide what thebest options would be for further managment.  We reviewed and considered a repeat bronchoscopy to assess if there was an area which could be dilated versus consider escalation of immunosuppression with infliximab or participate in a clinical trial.  After reviewing the case and discussing the case with Dr. Lange it was decided that a repeat bronchoscopy would not be the best strategy at this time.  Mr. Tello was offered a clinical trial versus infliximab.  He is considering enrolling in a clinical trial.    From a symptom standpoint, he denies any new symptoms.  He continues to have occasional shortness of breath, more so with strenuous activities.  He denies any palpitations or other symptoms.  No wheezing.  His appetite is good.  His energy level is good.    He is on prednisone 5 mg per day and methotrexate 15 mg per week.      Current Outpatient Medications   Medication     calcium carbonate-vitamin D (CALTRATE) 600-10 MG-MCG per tablet     FLUoxetine (PROZAC) 20 MG capsule     FLUoxetine (PROZAC) 40 MG capsule     methotrexate 2.5 MG tablet     MULTIPLE VITAMIN PO     predniSONE (DELTASONE) 5 MG tablet     simvastatin (ZOCOR) 40 MG tablet     STIOLTO RESPIMAT 2.5-2.5 MCG/ACT AERS     No current facility-administered medications for this visit.     No Known Allergies  History reviewed. No pertinent past medical history.    History reviewed. No pertinent surgical history.    Social History     Socioeconomic History     Marital status: Single     Spouse name: Not on file     Number of children: Not on file     Years of education: Not on file      "Highest education level: Not on file   Occupational History     Not on file   Tobacco Use     Smoking status: Never     Smokeless tobacco: Never   Vaping Use     Vaping status: Not on file   Substance and Sexual Activity     Alcohol use: Never     Drug use: Never     Sexual activity: Not on file   Other Topics Concern     Not on file   Social History Narrative     Not on file     Social Determinants of Health     Financial Resource Strain: Not on file   Food Insecurity: Not on file   Transportation Needs: Not on file   Physical Activity: Not on file   Stress: Not on file   Social Connections: Not on file   Intimate Partner Violence: Not on file   Housing Stability: Not on file       ROS Pulmonary    A complete ROS was otherwise negative except as noted in the HPI.  /77   Pulse 70   Resp 17   Ht 1.803 m (5' 11\")   Wt 86.6 kg (191 lb)   SpO2 97%   BMI 26.64 kg/m    Exam:   GENERAL APPEARANCE: Alert, and in no apparent distress.  EYES: PERRL, EOMI  NECK: supple, no masses, no thyromegaly.  LYMPHATICS: No significant axillary, cervical, or supraclavicular nodes.  RESP: normal percussion, good air flow throughout.  No crackles. No rhonchi. No wheezes.  CV: Normal S1, S2, regular rhythm, normal rate. No murmur.  No rub. No gallop. No LE edema.   MS: extremities normal. No clubbing. No cyanosis.  SKIN: no rash on limited exam  NEURO: Mentation intact, speech normal, normal strength and tone, normal gait and stance    Results:    PFTs done today were reviewed by me with the patient.  He also had a 6-minute walk test done where the 6-minute walk distance was in the normal range.  FVC 5 liters (119), Z score +1.30.  FEV1 3.14 (97%), Z score -0.18 and the ratio is 63.  DLCO not corrected for hemoglobin is 20.86.  My interpretation is that there is possible obstruction with normal diffusion capacity.  Air trapping is possible, but we will need lung volumes.  No exercise limitation based on 6-minute walk distance " with a 6-point drop in oxygen saturation.    Assessment and plan: A 67-year-old male of [place] descent with no family history of sarcoidosis, exposed to solvents for 40 years at a [employer] with initial symptoms in 2021, ultimately leading to EBUS-TBNA that showed features suggestive of granuloma.  Fungal and AFB stains not done on that tissue.  He was treated with prednisone and methotrexate.  1.  Pulmonary:  FVC decreased from 5.33 to 5 today.  No clear change in symptoms.  Plan to evaluate his candidacy for RESOLVE (namilumab) lung study, which is anti-GM-CSF antibody.  2.  No uptake in the myocardium on PET scan.  A 6-point drop noted in his O2 saturation today.  We will get an echo with bubble study to evaluate for PA pressures.  We will check metabolic labs and also LFTs.    I will review the case with Dr. Lange and update him about the current plan of pursuing a clinical trial for his sarcoidosis.    I will see him back in 4 months.      This no  consists of symbols derived from keyboarding, dictation and/or voice recognition software. As a result, there may be errors in the script that have gone undetected. Please consider this when interpreting information found in this chart    Answers for HPI/ROS submitted by the patient on 5/30/2023  General Symptoms: No  Skin Symptoms: No  HENT Symptoms: No  EYE SYMPTOMS: No  HEART SYMPTOMS: No  LUNG SYMPTOMS: Yes  INTESTINAL SYMPTOMS: No  URINARY SYMPTOMS: No  REPRODUCTIVE SYMPTOMS: No  SKELETAL SYMPTOMS: No  BLOOD SYMPTOMS: No  NERVOUS SYSTEM SYMPTOMS: No  MENTAL HEALTH SYMPTOMS: No  Cough: Yes  Sputum or phlegm: Yes  Coughing up blood: No  Difficulty breating or shortness of breath: No  Snoring: No  Wheezing: No  Difficulty breathing on exertion: Yes  Nighttime Cough: No  Difficulty breathing when lying flat: No

## 2023-05-31 NOTE — LETTER
5/31/2023         RE: Jose Carlos Tello  [address] [address] [facility] [id number]        Dear Colleague,    Thank you for referring your patient, Jose Carlos Tello, to the [facility] LUNG SCIENCE AND [facility] [place]. Please see a copy of my visit note below.    Reason for Visit  Jose Carlos Tello is a 67 year old year old male who is being seen for Sarcoidosis  Pulmonary HPI    The patient was seen and examined by Naveed Verde MD     Mr. Tello comes in for followup of his pulmonary sarcoidosis.  He was last seen in the Pulmonary Clinic in 02/2023.  At that time, the plan was to review his case in MDD and decide what thebest options would be for further managment.  We reviewed and considered a repeat bronchoscopy to assess if there was an area which could be dilated versus consider escalation of immunosuppression with infliximab or participate in a clinical trial.  After reviewing the case and discussing the case with Dr. Lange it was decided that a repeat bronchoscopy would not be the best strategy at this time.  Mr. Tello was offered a clinical trial versus infliximab.  He is considering enrolling in a clinical trial.    From a symptom standpoint, he denies any new symptoms.  He continues to have occasional shortness of breath, more so with strenuous activities.  He denies any palpitations or other symptoms.  No wheezing.  His appetite is good.  His energy level is good.    He is on prednisone 5 mg per day and methotrexate 15 mg per week.      Current Outpatient Medications   Medication    calcium carbonate-vitamin D (CALTRATE) 600-10 MG-MCG per tablet    FLUoxetine (PROZAC) 20 MG capsule    FLUoxetine (PROZAC) 40 MG capsule    methotrexate 2.5 MG tablet    MULTIPLE VITAMIN PO    predniSONE (DELTASONE) 5 MG tablet    simvastatin (ZOCOR) 40 MG tablet    STIOLTO RESPIMAT 2.5-2.5 MCG/ACT AERS     No current facility-administered medications for this visit.     No Known  "Allergies  History reviewed. No pertinent past medical history.    History reviewed. No pertinent surgical history.    Social History     Socioeconomic History    Marital status: Single     Spouse name: Not on file    Number of children: Not on file    Years of education: Not on file    Highest education level: Not on file   Occupational History    Not on file   Tobacco Use    Smoking status: Never    Smokeless tobacco: Never   Vaping Use    Vaping status: Not on file   Substance and Sexual Activity    Alcohol use: Never    Drug use: Never    Sexual activity: Not on file   Other Topics Concern    Not on file   Social History Narrative    Not on file     Social Determinants of Health     Financial Resource Strain: Not on file   Food Insecurity: Not on file   Transportation Needs: Not on file   Physical Activity: Not on file   Stress: Not on file   Social Connections: Not on file   Intimate Partner Violence: Not on file   Housing Stability: Not on file       ROS Pulmonary    A complete ROS was otherwise negative except as noted in the HPI.  /77   Pulse 70   Resp 17   Ht 1.803 m (5' 11\")   Wt 86.6 kg (191 lb)   SpO2 97%   BMI 26.64 kg/m    Exam:   GENERAL APPEARANCE: Alert, and in no apparent distress.  EYES: PERRL, EOMI  NECK: supple, no masses, no thyromegaly.  LYMPHATICS: No significant axillary, cervical, or supraclavicular nodes.  RESP: normal percussion, good air flow throughout.  No crackles. No rhonchi. No wheezes.  CV: Normal S1, S2, regular rhythm, normal rate. No murmur.  No rub. No gallop. No LE edema.   MS: extremities normal. No clubbing. No cyanosis.  SKIN: no rash on limited exam  NEURO: Mentation intact, speech normal, normal strength and tone, normal gait and stance    Results:    PFTs done today were reviewed by me with the patient.  He also had a 6-minute walk test done where the 6-minute walk distance was in the normal range.  FVC 5 liters (119), Z score +1.30.  FEV1 3.14 (97%), Z score " -0.18 and the ratio is 63.  DLCO not corrected for hemoglobin is 20.86.  My interpretation is that there is possible obstruction with normal diffusion capacity.  Air trapping is possible, but we will need lung volumes.  No exercise limitation based on 6-minute walk distance with a 6-point drop in oxygen saturation.    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  Fungal and AFB stains not done on that tissue.  He was treated with prednisone and methotrexate.  1.  Pulmonary:  FVC decreased from 5.33 to 5 today.  No clear change in symptoms.  Plan to evaluate his candidacy for RESOLVE (namilumab) lung study, which is anti-GM-CSF antibody.  2.  No uptake in the myocardium on PET scan.  A 6-point drop noted in his O2 saturation today.  We will get an echo with bubble study to evaluate for PA pressures.  We will check metabolic labs and also LFTs.    I will review the case with Dr. Lange and update him about the current plan of pursuing a clinical trial for his sarcoidosis.    I will see him back in 4 months.      This no  consists of symbols derived from keyboarding, dictation and/or voice recognition software. As a result, there may be errors in the script that have gone undetected. Please consider this when interpreting information found in this chart      Again, thank you for allowing me to participate in the care of your patient.        Sincerely,        Naveed Verde MD

## 2023-05-31 NOTE — NURSING NOTE
Chief Complaint   Patient presents with     RECHECK     Return Interstitial Lung Sarcoids     Medications reviewed and vital signs taken.   Fausto Rosas, CMA

## 2023-06-01 LAB
ACE SERPL-CCNC: 44 U/L
DLCOUNC-%PRED-PRE: 76 %
DLCOUNC-PRE: 20.86 ML/MIN/MMHG
DLCOUNC-PRED: 27.33 ML/MIN/MMHG
ERV-%PRED-PRE: 75 %
ERV-PRE: 1.01 L
ERV-PRED: 1.34 L
EXPTIME-PRE: 12.67 SEC
FEF2575-%PRED-PRE: 61 %
FEF2575-PRE: 1.57 L/SEC
FEF2575-PRED: 2.58 L/SEC
FEFMAX-%PRED-PRE: 82 %
FEFMAX-PRE: 7.4 L/SEC
FEFMAX-PRED: 8.96 L/SEC
FEV1-%PRED-PRE: 97 %
FEV1-PRE: 3.14 L
FEV1FEV6-PRE: 68 %
FEV1FEV6-PRED: 78 %
FEV1FVC-PRE: 63 %
FEV1FVC-PRED: 77 %
FEV1SVC-PRE: 61 %
FEV1SVC-PRED: 65 %
FIFMAX-PRE: 6.28 L/SEC
FVC-%PRED-PRE: 119 %
FVC-PRE: 5 L
FVC-PRED: 4.2 L
IC-%PRED-PRE: 116 %
IC-PRE: 4.19 L
IC-PRED: 3.59 L
VA-%PRED-PRE: 97 %
VA-PRE: 6.54 L
VC-%PRED-PRE: 104 %
VC-PRE: 5.2 L
VC-PRED: 4.97 L

## 2023-06-02 LAB — SCANNED LAB RESULT: NORMAL

## 2023-06-09 DIAGNOSIS — Z00.6 RESEARCH STUDY PATIENT: Primary | ICD-10-CM

## 2023-06-13 ENCOUNTER — HOSPITAL ENCOUNTER (OUTPATIENT)
Dept: CT IMAGING | Facility: CLINIC | Age: 68
Discharge: HOME | End: 2023-06-13
Attending: INTERNAL MEDICINE
Payer: COMMERCIAL

## 2023-06-13 DIAGNOSIS — D86.9 SARCOIDOSIS: ICD-10-CM

## 2023-06-13 PROCEDURE — 71250 CT THORAX DX C-: CPT | Mod: 26 | Performed by: RADIOLOGY

## 2023-06-13 PROCEDURE — 71250 CT THORAX DX C-: CPT

## 2023-06-21 ENCOUNTER — HOSPITAL ENCOUNTER (OUTPATIENT)
Dept: CARDIOLOGY | Facility: CLINIC | Age: 68
Discharge: HOME | End: 2023-06-21
Attending: INTERNAL MEDICINE | Admitting: INTERNAL MEDICINE
Payer: COMMERCIAL

## 2023-06-21 DIAGNOSIS — D86.85 CARDIAC SARCOIDOSIS: ICD-10-CM

## 2023-06-21 LAB — LVEF ECHO: NORMAL

## 2023-06-21 PROCEDURE — 93306 TTE W/DOPPLER COMPLETE: CPT

## 2023-06-21 PROCEDURE — 93306 TTE W/DOPPLER COMPLETE: CPT | Mod: 26 | Performed by: INTERNAL MEDICINE

## 2023-06-28 ENCOUNTER — HOSPITAL ENCOUNTER (OUTPATIENT)
Dept: RESEARCH | Facility: CLINIC | Age: 68
Discharge: HOME | End: 2023-06-28
Attending: INTERNAL MEDICINE | Admitting: INTERNAL MEDICINE
Payer: COMMERCIAL

## 2023-06-28 VITALS
TEMPERATURE: 97.9 F | RESPIRATION RATE: 16 BRPM | DIASTOLIC BLOOD PRESSURE: 73 MMHG | SYSTOLIC BLOOD PRESSURE: 131 MMHG | HEART RATE: 68 BPM

## 2023-06-28 DIAGNOSIS — D86.9 SARCOIDOSIS: Primary | ICD-10-CM

## 2023-06-28 DIAGNOSIS — Z00.6 EXAMINATION OF PARTICIPANT OR CONTROL IN CLINICAL RESEARCH: Primary | ICD-10-CM

## 2023-06-28 DIAGNOSIS — Z00.6 RESEARCH STUDY PATIENT: ICD-10-CM

## 2023-06-28 PROCEDURE — 300N000010 HC RESEARCH B&I SPECIMEN PACKAGING, COMPLEX

## 2023-06-28 PROCEDURE — 94375 RESPIRATORY FLOW VOLUME LOOP: CPT | Performed by: INTERNAL MEDICINE

## 2023-06-28 PROCEDURE — 94729 DIFFUSING CAPACITY: CPT | Performed by: INTERNAL MEDICINE

## 2023-06-28 PROCEDURE — 510N000029 HC RESEARCH B&I MEALS, PER MEAL

## 2023-06-28 PROCEDURE — 510N000023 HC CRU B&I PATIENT CARE, PER 15 MIN

## 2023-06-28 PROCEDURE — 510N000009 HC RESEARCH FACILITY, PER 15 MIN

## 2023-06-28 PROCEDURE — 96372 THER/PROPH/DIAG INJ SC/IM: CPT

## 2023-06-28 PROCEDURE — 300N000009 HC RESEARCH B&I SPECIMEN PROCESSING, COMPLEX

## 2023-06-28 PROCEDURE — 96374 THER/PROPH/DIAG INJ IV PUSH: CPT

## 2023-06-29 DIAGNOSIS — Z00.6 RESEARCH STUDY PATIENT: ICD-10-CM

## 2023-06-29 DIAGNOSIS — D86.9 SARCOIDOSIS: Primary | ICD-10-CM

## 2023-07-04 LAB
DLCOUNC-%PRED-PRE: 73 %
DLCOUNC-PRE: 20.14 ML/MIN/MMHG
DLCOUNC-PRED: 27.33 ML/MIN/MMHG
ERV-%PRED-PRE: 75 %
ERV-PRE: 1.02 L
ERV-PRED: 1.34 L
EXPTIME-PRE: 12.76 SEC
FEF2575-%PRED-PRE: 59 %
FEF2575-PRE: 1.53 L/SEC
FEF2575-PRED: 2.58 L/SEC
FEFMAX-%PRED-PRE: 93 %
FEFMAX-PRE: 8.35 L/SEC
FEFMAX-PRED: 8.96 L/SEC
FEV1-%PRED-PRE: 102 %
FEV1-PRE: 3.33 L
FEV1FEV6-PRE: 66 %
FEV1FEV6-PRED: 78 %
FEV1FVC-PRE: 61 %
FEV1FVC-PRED: 77 %
FEV1SVC-PRE: 60 %
FEV1SVC-PRED: 65 %
FIFMAX-PRE: 5.69 L/SEC
FVC-%PRED-PRE: 128 %
FVC-PRE: 5.41 L
FVC-PRED: 4.2 L
IC-%PRED-PRE: 126 %
IC-PRE: 4.55 L
IC-PRED: 3.59 L
VA-%PRED-PRE: 107 %
VA-PRE: 7.2 L
VC-%PRED-PRE: 112 %
VC-PRE: 5.57 L
VC-PRED: 4.97 L

## 2023-07-06 NOTE — ADDENDUM NOTE
Encounter addended by: Iggy Sotomayor on: 7/6/2023 8:13 AM   Actions taken: Charge Capture section accepted

## 2023-07-10 ENCOUNTER — HOSPITAL ENCOUNTER (OUTPATIENT)
Dept: RESEARCH | Facility: CLINIC | Age: 68
Discharge: HOME | End: 2023-07-10
Attending: INTERNAL MEDICINE | Admitting: INTERNAL MEDICINE
Payer: COMMERCIAL

## 2023-07-10 DIAGNOSIS — Z00.6 RESEARCH STUDY PATIENT: Primary | ICD-10-CM

## 2023-07-10 DIAGNOSIS — D86.9 SARCOIDOSIS: Primary | ICD-10-CM

## 2023-07-10 PROCEDURE — 96372 THER/PROPH/DIAG INJ SC/IM: CPT

## 2023-07-10 PROCEDURE — 510N000023 HC CRU B&I PATIENT CARE, PER 15 MIN

## 2023-07-10 PROCEDURE — 300N000010 HC RESEARCH B&I SPECIMEN PACKAGING, COMPLEX

## 2023-07-10 PROCEDURE — 510N000009 HC RESEARCH FACILITY, PER 15 MIN

## 2023-07-10 PROCEDURE — 300N000007 HC RESEARCH B&I SPECIMEN PROCESSING, SIMPLE

## 2023-07-10 PROCEDURE — 96374 THER/PROPH/DIAG INJ IV PUSH: CPT

## 2023-07-10 RX ORDER — PREDNISONE 1 MG/1
TABLET ORAL
Qty: 70 TABLET | Refills: 0 | Status: SHIPPED | OUTPATIENT
Start: 2023-07-24 | End: 2023-09-27

## 2023-07-10 NOTE — ADDENDUM NOTE
Encounter addended by: Lizabeth Fermin RN on: 7/10/2023 3:09 PM   Actions taken: Charge Capture section accepted

## 2023-07-11 NOTE — ADDENDUM NOTE
Encounter addended by: Iggy Sotomayor on: 7/11/2023 11:12 AM   Actions taken: Charge Capture section accepted

## 2023-08-07 ENCOUNTER — HOSPITAL ENCOUNTER (OUTPATIENT)
Dept: RESEARCH | Facility: CLINIC | Age: 68
Discharge: HOME | End: 2023-08-07
Attending: INTERNAL MEDICINE | Admitting: INTERNAL MEDICINE
Payer: COMMERCIAL

## 2023-08-07 DIAGNOSIS — Z00.6 EXAMINATION OF PARTICIPANT OR CONTROL IN CLINICAL RESEARCH: Primary | ICD-10-CM

## 2023-08-07 PROCEDURE — 96372 THER/PROPH/DIAG INJ SC/IM: CPT

## 2023-08-07 PROCEDURE — 510N000023 HC CRU B&I PATIENT CARE, PER 15 MIN

## 2023-08-07 PROCEDURE — 96374 THER/PROPH/DIAG INJ IV PUSH: CPT

## 2023-08-07 PROCEDURE — 300N000008 HC RESEARCH B&I SPECIMEN PROCESSING, MODERATE

## 2023-08-07 PROCEDURE — 510N000009 HC RESEARCH FACILITY, PER 15 MIN

## 2023-08-07 PROCEDURE — 300N000010 HC RESEARCH B&I SPECIMEN PACKAGING, COMPLEX

## 2023-08-08 NOTE — ADDENDUM NOTE
Encounter addended by: Iggy Sotomayor on: 8/8/2023 3:05 PM   Actions taken: Charge Capture section accepted

## 2023-08-15 NOTE — ADDENDUM NOTE
Encounter addended by: Lizabeth Fermin RN on: 8/15/2023 9:42 AM   Actions taken: Charge Capture section accepted

## 2023-09-07 ENCOUNTER — HOSPITAL ENCOUNTER (OUTPATIENT)
Dept: RESEARCH | Facility: CLINIC | Age: 68
Discharge: HOME | End: 2023-09-07
Attending: INTERNAL MEDICINE | Admitting: INTERNAL MEDICINE
Payer: COMMERCIAL

## 2023-09-07 DIAGNOSIS — D86.9 SARCOIDOSIS: Primary | ICD-10-CM

## 2023-09-07 PROCEDURE — 96374 THER/PROPH/DIAG INJ IV PUSH: CPT

## 2023-09-07 PROCEDURE — 96372 THER/PROPH/DIAG INJ SC/IM: CPT

## 2023-09-07 PROCEDURE — 510N000009 HC RESEARCH FACILITY, PER 15 MIN

## 2023-09-07 PROCEDURE — 510N000023 HC CRU B&I PATIENT CARE, PER 15 MIN

## 2023-09-07 NOTE — ADDENDUM NOTE
Encounter addended by: Lizabeth Fermin RN on: 9/7/2023 1:25 PM   Actions taken: Charge Capture section accepted

## 2023-09-07 NOTE — ADDENDUM NOTE
Encounter addended by: Diana Worthy RN on: 9/7/2023 11:37 AM   Actions taken: Charge Capture section accepted

## 2023-09-08 NOTE — ADDENDUM NOTE
Encounter addended by: Iggy Sotomayor on: 9/8/2023 2:05 PM   Actions taken: Charge Capture section accepted

## 2023-09-26 ASSESSMENT — ENCOUNTER SYMPTOMS
HEMOPTYSIS: 0
WHEEZING: 1
DYSPNEA ON EXERTION: 1
COUGH: 1
COUGH DISTURBING SLEEP: 0
SHORTNESS OF BREATH: 0
SPUTUM PRODUCTION: 1
POSTURAL DYSPNEA: 0
SNORES LOUDLY: 0

## 2023-09-27 ENCOUNTER — OFFICE VISIT (OUTPATIENT)
Dept: PULMONOLOGY | Facility: CLINIC | Age: 68
End: 2023-09-27
Attending: INTERNAL MEDICINE
Payer: COMMERCIAL

## 2023-09-27 VITALS
RESPIRATION RATE: 17 BRPM | DIASTOLIC BLOOD PRESSURE: 84 MMHG | WEIGHT: 192 LBS | OXYGEN SATURATION: 100 % | HEIGHT: 71 IN | SYSTOLIC BLOOD PRESSURE: 129 MMHG | BODY MASS INDEX: 26.88 KG/M2 | HEART RATE: 88 BPM

## 2023-09-27 DIAGNOSIS — D86.85 CARDIAC SARCOIDOSIS: ICD-10-CM

## 2023-09-27 DIAGNOSIS — Z00.6 RESEARCH STUDY PATIENT: ICD-10-CM

## 2023-09-27 DIAGNOSIS — D86.9 SARCOIDOSIS: Primary | ICD-10-CM

## 2023-09-27 LAB
6 MIN WALK (FT): 1610 FT
6 MIN WALK (M): 491 M

## 2023-09-27 PROCEDURE — 94618 PULMONARY STRESS TESTING: CPT | Performed by: INTERNAL MEDICINE

## 2023-09-27 PROCEDURE — 99214 OFFICE O/P EST MOD 30 MIN: CPT | Mod: 25 | Performed by: INTERNAL MEDICINE

## 2023-09-27 PROCEDURE — 94729 DIFFUSING CAPACITY: CPT | Performed by: INTERNAL MEDICINE

## 2023-09-27 PROCEDURE — G0463 HOSPITAL OUTPT CLINIC VISIT: HCPCS | Performed by: INTERNAL MEDICINE

## 2023-09-27 PROCEDURE — 94375 RESPIRATORY FLOW VOLUME LOOP: CPT | Performed by: INTERNAL MEDICINE

## 2023-09-27 ASSESSMENT — PAIN SCALES - GENERAL: PAINLEVEL: NO PAIN (0)

## 2023-09-27 NOTE — PROGRESS NOTES
Reason for Visit  Jose Carlos Tello is a 68 year old year old male who is being seen for ILD.  Pulmonary HPI    The patient was seen and examined by Naveed Verde MD     Mr. Tello comes in to the clinic for follow-up of his sarcoidosis.  He was last seen in the pulmonary clinic on 5/31/2023.  At that time he was on methotrexate 15 mg/week and prednisone 5 mg/day for pulmonary sarcoidosis primarily with obstructive physiology.  Since then he is been randomized into RESOLVE lung study.  As a part of the study requirement, methotrexate was stopped on June 25.  He received the first infusion of the study drug on June 28.  Also has a part of the study prednisone was tapered off on September 4.    He comes in today for routine follow-up.  Overall he denies any significant changes in his pulmonary symptoms.  He reports no change in endurance.  With usual activities he has no shortness of breath but he feels shortness of breath with activities such as playing with his Grandkids on a basketball court.  He denies cough.  He denies chest pain.      Current Outpatient Medications   Medication    calcium carbonate-vitamin D (CALTRATE) 600-10 MG-MCG per tablet    FLUoxetine (PROZAC) 20 MG capsule    FLUoxetine (PROZAC) 40 MG capsule    MULTIPLE VITAMIN PO    simvastatin (ZOCOR) 40 MG tablet    STIOLTO RESPIMAT 2.5-2.5 MCG/ACT AERS    study - namilumab VS placebo, IDS# [id number], SOLN SubQ injection     Current Facility-Administered Medications   Medication    study - namilumab VS placebo (IDS# [id number]) SubQ injection 150 mg    study - namilumab VS placebo (IDS# [id number]) SubQ injection 150 mg     No Known Allergies  History reviewed. No pertinent past medical history.    History reviewed. No pertinent surgical history.    Social History     Socioeconomic History    Marital status: Single     Spouse name: Not on file    Number of children: Not on file    Years of education: Not on file    Highest education level: Not on file   Occupational  "History    Not on file   Tobacco Use    Smoking status: Never    Smokeless tobacco: Never   Substance and Sexual Activity    Alcohol use: Never    Drug use: Never    Sexual activity: Not on file   Other Topics Concern    Not on file   Social History Narrative    Not on file     Social Determinants of Health     Financial Resource Strain: Not on file   Food Insecurity: Not on file   Transportation Needs: Not on file   Physical Activity: Not on file   Stress: Not on file   Social Connections: Not on file   Interpersonal Safety: Not on file   Housing Stability: Not on file       ROS Pulmonary    A complete ROS was otherwise negative except as noted in the HPI.  /84   Pulse 88   Resp 17   Ht 1.803 m (5' 11\")   Wt 87.1 kg (192 lb)   SpO2 100%   BMI 26.78 kg/m    Exam:   GENERAL APPEARANCE: Alert, and in no apparent distress.  EYES: PERRL, EOMI  HENT: Nasal mucosa with no edema and no hyperemia.   MOUTH: Oral mucosa is moist, without any lesions, no tonsillar enlargement, no oropharyngeal exudate.  NECK: supple, no masses, no thyromegaly.  LYMPHATICS: No significant axillary, cervical, or supraclavicular nodes.  RESP: normal percussion, good air flow throughout.  No crackles. No rhonchi. No wheezes.  CV: Normal S1, S2, regular rhythm, normal rate. No murmur.  No rub. No gallop. No LE edema.   MS: extremities normal. No clubbing. No cyanosis.  SKIN: no rash on limited exam  NEURO: Mentation intact, speech normal, normal strength and tone, normal gait and stance    "

## 2023-09-27 NOTE — LETTER
9/27/2023         RE: Jose Carlos Tello  [id number] [facility] [id number]        Dear Colleague,    Thank you for referring your patient, Jose Carlos Tello, to the [facility] FOR LUNG SCIENCE AND [facility] [place]. Please see a copy of my visit note below.    Reason for Visit  Jose Carlos Tello is a 68 year old year old male who is being seen for ILD.  Pulmonary HPI    The patient was seen and examined by Naveed Verde MD     Mr. Tello comes in to the clinic for follow-up of his sarcoidosis.  He was last seen in the pulmonary clinic on 5/31/2023.  At that time he was on methotrexate 15 mg/week and prednisone 5 mg/day for pulmonary sarcoidosis primarily with obstructive physiology.  Since then he is been randomized into RESOLVE lung study.  As a part of the study requirement, methotrexate was stopped on June 25.  He received the first infusion of the study drug on June 28.  Also has a part of the study prednisone was tapered off on September 4.    He comes in today for routine follow-up.  Overall he denies any significant changes in his pulmonary symptoms.  He reports no change in endurance.  With usual activities he has no shortness of breath but he feels shortness of breath with activities such as playing with his Grandkids on a basketball court.  He denies cough.  He denies chest pain.      Current Outpatient Medications   Medication    calcium carbonate-vitamin D (CALTRATE) 600-10 MG-MCG per tablet    FLUoxetine (PROZAC) 20 MG capsule    FLUoxetine (PROZAC) 40 MG capsule    MULTIPLE VITAMIN PO    simvastatin (ZOCOR) 40 MG tablet    STIOLTO RESPIMAT 2.5-2.5 MCG/ACT AERS    study - namilumab VS placebo, IDS# [id number], SOLN SubQ injection     Current Facility-Administered Medications   Medication    study - namilumab VS placebo (IDS# [id number]) SubQ injection 150 mg    study - namilumab VS placebo (IDS# [id number]) SubQ injection 150 mg     No Known Allergies  History reviewed. No pertinent  "past medical history.    History reviewed. No pertinent surgical history.    Social History     Socioeconomic History    Marital status: Single     Spouse name: Not on file    Number of children: Not on file    Years of education: Not on file    Highest education level: Not on file   Occupational History    Not on file   Tobacco Use    Smoking status: Never    Smokeless tobacco: Never   Substance and Sexual Activity    Alcohol use: Never    Drug use: Never    Sexual activity: Not on file   Other Topics Concern    Not on file   Social History Narrative    Not on file     Social Determinants of Health     Financial Resource Strain: Not on file   Food Insecurity: Not on file   Transportation Needs: Not on file   Physical Activity: Not on file   Stress: Not on file   Social Connections: Not on file   Interpersonal Safety: Not on file   Housing Stability: Not on file       ROS Pulmonary    A complete ROS was otherwise negative except as noted in the HPI.  /84   Pulse 88   Resp 17   Ht 1.803 m (5' 11\")   Wt 87.1 kg (192 lb)   SpO2 100%   BMI 26.78 kg/m    Exam:   GENERAL APPEARANCE: Alert, and in no apparent distress.  EYES: PERRL, EOMI  HENT: Nasal mucosa with no edema and no hyperemia.   MOUTH: Oral mucosa is moist, without any lesions, no tonsillar enlargement, no oropharyngeal exudate.  NECK: supple, no masses, no thyromegaly.  LYMPHATICS: No significant axillary, cervical, or supraclavicular nodes.  RESP: normal percussion, good air flow throughout.  No crackles. No rhonchi. No wheezes.  CV: Normal S1, S2, regular rhythm, normal rate. No murmur.  No rub. No gallop. No LE edema.   MS: extremities normal. No clubbing. No cyanosis.  SKIN: no rash on limited exam  NEURO: Mentation intact, speech normal, normal strength and tone, normal gait and stance      Reason for Visit  Jose Carlos Tello is a 68 year old year old male who is being seen for sarcoidosis  Pulmonary HPI    The patient was seen and examined by " Naveed Verde MD     Mr. Tello comes in to the clinic for follow-up of his sarcoidosis.  He was last seen in the pulmonary clinic on 5/31/2023.  At that time he was on methotrexate 15 mg/week and prednisone 5 mg/day for pulmonary sarcoidosis primarily with obstructive physiology.  Since then he is been randomized into RESOLVE lung study.  As a part of the study requirement, methotrexate was stopped on June 25.  He received the first infusion of the study drug on June 28.  Also has a part of the study prednisone was tapered off on September 4.     He comes in today for routine follow-up.  Overall he denies any significant changes in his pulmonary symptoms.  He reports no change in endurance.  With usual activities he has no shortness of breath but he feels shortness of breath with activities such as playing with his Grandkids on a basketball court.  He denies cough.  He denies chest pain.      Current Outpatient Medications   Medication    calcium carbonate-vitamin D (CALTRATE) 600-10 MG-MCG per tablet    FLUoxetine (PROZAC) 20 MG capsule    FLUoxetine (PROZAC) 40 MG capsule    MULTIPLE VITAMIN PO    simvastatin (ZOCOR) 40 MG tablet    STIOLTO RESPIMAT 2.5-2.5 MCG/ACT AERS    study - namilumab VS placebo, IDS# [id number], SOLN SubQ injection     Current Facility-Administered Medications   Medication    study - namilumab VS placebo (IDS# [id number]) SubQ injection 150 mg    study - namilumab VS placebo (IDS# [id number]) SubQ injection 150 mg     No Known Allergies  History reviewed. No pertinent past medical history.    History reviewed. No pertinent surgical history.    Social History     Socioeconomic History    Marital status: Single     Spouse name: Not on file    Number of children: Not on file    Years of education: Not on file    Highest education level: Not on file   Occupational History    Not on file   Tobacco Use    Smoking status: Never    Smokeless tobacco: Never   Substance and Sexual Activity    Alcohol use: Never  "   Drug use: Never    Sexual activity: Not on file   Other Topics Concern    Not on file   Social History Narrative    Not on file     Social Determinants of Health     Financial Resource Strain: Not on file   Food Insecurity: Not on file   Transportation Needs: Not on file   Physical Activity: Not on file   Stress: Not on file   Social Connections: Not on file   Interpersonal Safety: Not on file   Housing Stability: Not on file       ROS Pulmonary    A complete ROS was otherwise negative except as noted in the HPI.  /84   Pulse 88   Resp 17   Ht 1.803 m (5' 11\")   Wt 87.1 kg (192 lb)   SpO2 100%   BMI 26.78 kg/m    Exam:   GENERAL APPEARANCE: Alert, and in no apparent distress.  EYES: PERRL, EOMI  HENT: Nasal mucosa with no edema and no hyperemia.   MOUTH: Oral mucosa is moist, without any lesions, no tonsillar enlargement, no oropharyngeal exudate.  NECK: supple, no masses, no thyromegaly.  LYMPHATICS: No significant axillary, cervical, or supraclavicular nodes.  RESP: normal percussion, good air flow throughout.  No crackles. No rhonchi. No wheezes.  CV: Normal S1, S2, regular rhythm, normal rate. No murmur.  No rub. No gallop. No LE edema.   MS: extremities normal. No clubbing. No cyanosis.  SKIN: no rash on limited exam  NEURO: Mentation intact, speech normal, normal strength and tone, normal gait and stance    Results:  PFTs done today were reviewed by me with the patient.  She also had a 6-minute walk test done with a 6-minute walk distance was 1610 feet.  The lower limit of normal is 1495 feet.  O2 saturation the beginning of the walk was 100%.  The lowest O2 saturation was 94%.  FVC 4.96 L (119%), Z score +1.32.  FEV1 2.95 (92%), Z score -0.47.  FEV1 FVC ratio is 60.  DLCO not corrected for hemoglobin is 20.93 (77%), Z score -1.43.  My depression is that he has obstruction based on decreased FEV1 FVC ratio.  In comparison to prior spirometry the FVC has decreased by 450 cc and FEV1 is decreased " by 380 cc.    ECHO: Global and regional left ventricular function is normal with an EF of 55-60%. Left ventricular wall thickness is normal. Left ventricular size is normal. Left ventricular diastolic function is indeterminate. Diastolic Doppler findings (E/E' ratio and/or other parameters) suggest left ventricular filling  pressures are normal. No regional wall motion abnormalities are seen. Right ventricular systolic pressure is 38mmHg above the right atrial  pressure.    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  Fungal and AFB stains not done on that tissue.  He was treated with prednisone and methotrexate   ECHO 6/21/23 Global and regional left ventricular function is normal with an EF of 55-60%. RVEDv 172ml. RVESv 108ml. RV EF 37.2%. TAPSE 15.6%. RVFAC 29.4%. RVLSs -7.2%.RVLSfw -22.4%. Right ventricular systolic pressure is 38mmHg above the right atrial pressure. IVC diameter >2.1 cm collapsing <50% with sniff suggests a high RA pressure estimated at 15 mmHg or greater.No pericardial effusion is present.  1.  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  Currently and RESOLVE lung study (namilumab vs placebo).  Infusion of the study drug on June 28, 2023.  Start methotrexate June 25.  Stop /prednisone September 4.  No change in endurance we will continue to monitor.  2.  Extrapulmonary sarcoidosis.  Follows with ophthalmology outside [facility].  No evidence of ocular sarcoid based on recent exam.  Right bundle branch block at baseline but negative cardiac PET scan March 2023.  We will check metabolic labs today.  3.  RVSP 38 mmHg indicating mild pulmonary hypertension.  Has not had RHC.  We will continue to monitor for now.  May need evaluation for  pulm hypertension.  This note consists of symbols derived from keyboarding, dictation and/or voice recognition software. As a result, there may be errors in the script that have gone undetected. Please consider this when interpreting information found in this chart    Addendum: Labs reviewed.  Electrolyte panel in normal range.  Serum creatinine 1.48 and is higher than May 2023.  G FR.  LFTs, vitamin D, ACE and CBC are in the normal range.  1, 25-dihydroxy vitamin D levels are still pending.  Plan to repeat creatinine in 2 weeks    Answers submitted by the patient for this visit:  Symptoms you have experienced in the last 30 days (Submitted on 9/26/2023)  General Symptoms: No  Skin Symptoms: No  HENT Symptoms: No  EYE SYMPTOMS: No  HEART SYMPTOMS: No  LUNG SYMPTOMS: Yes  INTESTINAL SYMPTOMS: No  URINARY SYMPTOMS: No  REPRODUCTIVE SYMPTOMS: No  SKELETAL SYMPTOMS: No   BLOOD SYMPTOMS: No  NERVOUS SYSTEM SYMPTOMS: No  MENTAL HEALTH SYMPTOMS: No  Please answer the questions below to tell us what condition you are experiencing: (Submitted on 9/26/2023)  Cough: Yes  Sputum or phlegm: Yes  Coughing up blood: No   Difficulty breating or shortness of breath: No  Snoring: No  Wheezing: Yes  Difficulty breathing on exertion: Yes  Nighttime Cough: No  Difficulty breathing when lying flat: No    Sincerely,        Naveed Verde MD

## 2023-09-27 NOTE — PROGRESS NOTES
Reason for Visit  Jose Carlos Tello is a 68 year old year old male who is being seen for sarcoidosis  Pulmonary HPI    The patient was seen and examined by Naveed Verde MD     Mr. Tello comes in to the clinic for follow-up of his sarcoidosis.  He was last seen in the pulmonary clinic on 5/31/2023.  At that time he was on methotrexate 15 mg/week and prednisone 5 mg/day for pulmonary sarcoidosis primarily with obstructive physiology.  Since then he is been randomized into RESOLVE lung study.  As a part of the study requirement, methotrexate was stopped on June 25.  He received the first infusion of the study drug on June 28.  Also has a part of the study prednisone was tapered off on September 4.     He comes in today for routine follow-up.  Overall he denies any significant changes in his pulmonary symptoms.  He reports no change in endurance.  With usual activities he has no shortness of breath but he feels shortness of breath with activities such as playing with his Grandkids on a basketball court.  He denies cough.  He denies chest pain.      Current Outpatient Medications   Medication    calcium carbonate-vitamin D (CALTRATE) 600-10 MG-MCG per tablet    FLUoxetine (PROZAC) 20 MG capsule    FLUoxetine (PROZAC) 40 MG capsule    MULTIPLE VITAMIN PO    simvastatin (ZOCOR) 40 MG tablet    STIOLTO RESPIMAT 2.5-2.5 MCG/ACT AERS    study - namilumab VS placebo, IDS# [id number], SOLN SubQ injection     Current Facility-Administered Medications   Medication    study - namilumab VS placebo (IDS# [id number]) SubQ injection 150 mg    study - namilumab VS placebo (IDS# [id number]) SubQ injection 150 mg     No Known Allergies  History reviewed. No pertinent past medical history.    History reviewed. No pertinent surgical history.    Social History     Socioeconomic History    Marital status: Single     Spouse name: Not on file    Number of children: Not on file    Years of education: Not on file    Highest education level: Not on file  "  Occupational History    Not on file   Tobacco Use    Smoking status: Never    Smokeless tobacco: Never   Substance and Sexual Activity    Alcohol use: Never    Drug use: Never    Sexual activity: Not on file   Other Topics Concern    Not on file   Social History Narrative    Not on file     Social Determinants of Health     Financial Resource Strain: Not on file   Food Insecurity: Not on file   Transportation Needs: Not on file   Physical Activity: Not on file   Stress: Not on file   Social Connections: Not on file   Interpersonal Safety: Not on file   Housing Stability: Not on file       ROS Pulmonary    A complete ROS was otherwise negative except as noted in the HPI.  /84   Pulse 88   Resp 17   Ht 1.803 m (5' 11\")   Wt 87.1 kg (192 lb)   SpO2 100%   BMI 26.78 kg/m    Exam:   GENERAL APPEARANCE: Alert, and in no apparent distress.  EYES: PERRL, EOMI  HENT: Nasal mucosa with no edema and no hyperemia.   MOUTH: Oral mucosa is moist, without any lesions, no tonsillar enlargement, no oropharyngeal exudate.  NECK: supple, no masses, no thyromegaly.  LYMPHATICS: No significant axillary, cervical, or supraclavicular nodes.  RESP: normal percussion, good air flow throughout.  No crackles. No rhonchi. No wheezes.  CV: Normal S1, S2, regular rhythm, normal rate. No murmur.  No rub. No gallop. No LE edema.   MS: extremities normal. No clubbing. No cyanosis.  SKIN: no rash on limited exam  NEURO: Mentation intact, speech normal, normal strength and tone, normal gait and stance    Results:  PFTs done today were reviewed by me with the patient.  She also had a 6-minute walk test done with a 6-minute walk distance was 1610 feet.  The lower limit of normal is 1495 feet.  O2 saturation the beginning of the walk was 100%.  The lowest O2 saturation was 94%.  FVC 4.96 L (119%), Z score +1.32.  FEV1 2.95 (92%), Z score -0.47.  FEV1 FVC ratio is 60.  DLCO not corrected for hemoglobin is 20.93 (77%), Z score -1.43.  My " depression is that he has obstruction based on decreased FEV1 FVC ratio.  In comparison to prior spirometry the FVC has decreased by 450 cc and FEV1 is decreased by 380 cc.    ECHO: Global and regional left ventricular function is normal with an EF of 55-60%. Left ventricular wall thickness is normal. Left ventricular size is normal. Left ventricular diastolic function is indeterminate. Diastolic Doppler findings (E/E' ratio and/or other parameters) suggest left ventricular filling  pressures are normal. No regional wall motion abnormalities are seen. Right ventricular systolic pressure is 38mmHg above the right atrial  pressure.    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  Fungal and AFB stains not done on that tissue.  He was treated with prednisone and methotrexate   ECHO 6/21/23 Global and regional left ventricular function is normal with an EF of 55-60%. RVEDv 172ml. RVESv 108ml. RV EF 37.2%. TAPSE 15.6%. RVFAC 29.4%. RVLSs -7.2%.RVLSfw -22.4%. Right ventricular systolic pressure is 38mmHg above the right atrial pressure. IVC diameter >2.1 cm collapsing <50% with sniff suggests a high RA pressure estimated at 15 mmHg or greater.No pericardial effusion is present.  1.  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  Currently and RESOLVE lung study (namilumab vs placebo).  Infusion of the study drug on June 28, 2023.  Start methotrexate June 25.  Stop /prednisone September 4.  No change in endurance we will continue to monitor.  2.  Extrapulmonary sarcoidosis.  Follows with ophthalmology outside [facility] system.  No evidence of ocular sarcoid based on recent exam.  Right bundle branch block at baseline but negative cardiac PET scan March 2023.  We will check  metabolic labs today.  3.  RVSP 38 mmHg indicating mild pulmonary hypertension.  Has not had RHC.  We will continue to monitor for now.  May need evaluation for pulm hypertension.  This note consists of symbols derived from keyboarding, dictation and/or voice recognition software. As a result, there may be errors in the script that have gone undetected. Please consider this when interpreting information found in this chart    Addendum: Labs reviewed.  Electrolyte panel in normal range.  Serum creatinine 1.48 and is higher than May 2023.  G FR.  LFTs, vitamin D, ACE and CBC are in the normal range.  1, 25-dihydroxy vitamin D levels are still pending.  Plan to repeat creatinine in 2 weeks    Answers submitted by the patient for this visit:  Symptoms you have experienced in the last 30 days (Submitted on 9/26/2023)  General Symptoms: No  Skin Symptoms: No  HENT Symptoms: No  EYE SYMPTOMS: No  HEART SYMPTOMS: No  LUNG SYMPTOMS: Yes  INTESTINAL SYMPTOMS: No  URINARY SYMPTOMS: No  REPRODUCTIVE SYMPTOMS: No  SKELETAL SYMPTOMS: No   BLOOD SYMPTOMS: No  NERVOUS SYSTEM SYMPTOMS: No  MENTAL HEALTH SYMPTOMS: No  Please answer the questions below to tell us what condition you are experiencing: (Submitted on 9/26/2023)  Cough: Yes  Sputum or phlegm: Yes  Coughing up blood: No   Difficulty breating or shortness of breath: No  Snoring: No  Wheezing: Yes  Difficulty breathing on exertion: Yes  Nighttime Cough: No  Difficulty breathing when lying flat: No

## 2023-09-28 ENCOUNTER — PATIENT OUTREACH (OUTPATIENT)
Dept: PULMONOLOGY | Facility: CLINIC | Age: 68
End: 2023-09-28

## 2023-09-28 ENCOUNTER — LAB (OUTPATIENT)
Dept: LAB | Facility: CLINIC | Age: 68
End: 2023-09-28
Payer: COMMERCIAL

## 2023-09-28 DIAGNOSIS — D86.9 SARCOIDOSIS: ICD-10-CM

## 2023-09-28 LAB
ALBUMIN SERPL BCG-MCNC: 4.6 G/DL (ref 3.5–5.2)
ALP SERPL-CCNC: 112 U/L (ref 40–129)
ALT SERPL W P-5'-P-CCNC: 26 U/L (ref 0–70)
ANION GAP SERPL CALCULATED.3IONS-SCNC: 13 MMOL/L (ref 7–15)
AST SERPL W P-5'-P-CCNC: 33 U/L (ref 0–45)
BASOPHILS # BLD AUTO: 0.1 10E3/UL (ref 0–0.2)
BASOPHILS NFR BLD AUTO: 1 %
BILIRUB SERPL-MCNC: 0.8 MG/DL
BUN SERPL-MCNC: 20.9 MG/DL (ref 8–23)
CALCIUM SERPL-MCNC: 9.8 MG/DL (ref 8.8–10.2)
CHLORIDE SERPL-SCNC: 103 MMOL/L (ref 98–107)
CREAT SERPL-MCNC: 1.48 MG/DL (ref 0.67–1.17)
DLCOUNC-%PRED-PRE: 77 %
DLCOUNC-PRE: 20.93 ML/MIN/MMHG
DLCOUNC-PRED: 27.13 ML/MIN/MMHG
EGFRCR SERPLBLD CKD-EPI 2021: 52 ML/MIN/1.73M2
EOSINOPHIL # BLD AUTO: 0.1 10E3/UL (ref 0–0.7)
EOSINOPHIL NFR BLD AUTO: 1 %
ERV-%PRED-PRE: 99 %
ERV-PRE: 1.54 L
ERV-PRED: 1.55 L
ERYTHROCYTE [DISTWIDTH] IN BLOOD BY AUTOMATED COUNT: 12.9 % (ref 10–15)
EXPTIME-PRE: 13.18 SEC
FEF2575-%PRED-PRE: 49 %
FEF2575-PRE: 1.25 L/SEC
FEF2575-PRED: 2.52 L/SEC
FEFMAX-%PRED-PRE: 80 %
FEFMAX-PRE: 7.1 L/SEC
FEFMAX-PRED: 8.86 L/SEC
FEV1-%PRED-PRE: 92 %
FEV1-PRE: 2.95 L
FEV1FEV6-PRE: 65 %
FEV1FEV6-PRED: 78 %
FEV1FVC-PRE: 60 %
FEV1FVC-PRED: 77 %
FEV1SVC-PRE: 60 %
FEV1SVC-PRED: 65 %
FIFMAX-PRE: 4.73 L/SEC
FVC-%PRED-PRE: 119 %
FVC-PRE: 4.96 L
FVC-PRED: 4.16 L
GLUCOSE SERPL-MCNC: 84 MG/DL (ref 70–99)
HCO3 SERPL-SCNC: 23 MMOL/L (ref 22–29)
HCT VFR BLD AUTO: 43.5 % (ref 40–53)
HGB BLD-MCNC: 14.9 G/DL (ref 13.3–17.7)
IC-%PRED-PRE: 109 %
IC-PRE: 3.4 L
IC-PRED: 3.1 L
IMM GRANULOCYTES # BLD: 0 10E3/UL
IMM GRANULOCYTES NFR BLD: 1 %
LYMPHOCYTES # BLD AUTO: 0.6 10E3/UL (ref 0.8–5.3)
LYMPHOCYTES NFR BLD AUTO: 10 %
MCH RBC QN AUTO: 28.5 PG (ref 26.5–33)
MCHC RBC AUTO-ENTMCNC: 34.3 G/DL (ref 31.5–36.5)
MCV RBC AUTO: 83 FL (ref 78–100)
MONOCYTES # BLD AUTO: 0.5 10E3/UL (ref 0–1.3)
MONOCYTES NFR BLD AUTO: 8 %
NEUTROPHILS # BLD AUTO: 5 10E3/UL (ref 1.6–8.3)
NEUTROPHILS NFR BLD AUTO: 79 %
NRBC # BLD AUTO: 0 10E3/UL
NRBC BLD AUTO-RTO: 0 /100
PLATELET # BLD AUTO: 260 10E3/UL (ref 150–450)
POTASSIUM SERPL-SCNC: 4.3 MMOL/L (ref 3.4–5.3)
PROT SERPL-MCNC: 8 G/DL (ref 6.4–8.3)
PTH-INTACT SERPL-MCNC: 66 PG/ML (ref 15–65)
RBC # BLD AUTO: 5.22 10E6/UL (ref 4.4–5.9)
SODIUM SERPL-SCNC: 139 MMOL/L (ref 135–145)
VA-%PRED-PRE: 94 %
VA-PRE: 6.33 L
VC-%PRED-PRE: 100 %
VC-PRE: 4.93 L
VC-PRED: 4.89 L
VIT D+METAB SERPL-MCNC: 30 NG/ML (ref 20–50)
WBC # BLD AUTO: 6.3 10E3/UL (ref 4–11)

## 2023-09-28 PROCEDURE — 82164 ANGIOTENSIN I ENZYME TEST: CPT | Mod: 90 | Performed by: PATHOLOGY

## 2023-09-28 PROCEDURE — 80053 COMPREHEN METABOLIC PANEL: CPT | Performed by: PATHOLOGY

## 2023-09-28 PROCEDURE — 82652 VIT D 1 25-DIHYDROXY: CPT | Performed by: INTERNAL MEDICINE

## 2023-09-28 PROCEDURE — 85025 COMPLETE CBC W/AUTO DIFF WBC: CPT | Performed by: PATHOLOGY

## 2023-09-28 PROCEDURE — 36415 COLL VENOUS BLD VENIPUNCTURE: CPT | Performed by: PATHOLOGY

## 2023-09-28 PROCEDURE — 99000 SPECIMEN HANDLING OFFICE-LAB: CPT | Performed by: PATHOLOGY

## 2023-09-28 PROCEDURE — 83520 IMMUNOASSAY QUANT NOS NONAB: CPT | Performed by: INTERNAL MEDICINE

## 2023-09-28 PROCEDURE — 83970 ASSAY OF PARATHORMONE: CPT | Performed by: PATHOLOGY

## 2023-09-28 PROCEDURE — 82306 VITAMIN D 25 HYDROXY: CPT | Performed by: INTERNAL MEDICINE

## 2023-09-28 NOTE — PROGRESS NOTES
Dr Verde reviewed lab from yesterday, creatinine was elevated, recommended patient follow up with his PCP. Informed patient, he will contact his PCP.

## 2023-09-30 LAB — ACE SERPL-CCNC: 80 U/L

## 2023-10-02 ENCOUNTER — HOSPITAL ENCOUNTER (OUTPATIENT)
Dept: RESEARCH | Facility: CLINIC | Age: 68
Discharge: HOME | End: 2023-10-02
Attending: INTERNAL MEDICINE | Admitting: INTERNAL MEDICINE
Payer: COMMERCIAL

## 2023-10-02 VITALS
RESPIRATION RATE: 20 BRPM | SYSTOLIC BLOOD PRESSURE: 116 MMHG | TEMPERATURE: 97.9 F | BODY MASS INDEX: 26.32 KG/M2 | DIASTOLIC BLOOD PRESSURE: 73 MMHG | HEART RATE: 65 BPM | WEIGHT: 188.71 LBS

## 2023-10-02 DIAGNOSIS — Z00.6 EXAMINATION OF PARTICIPANT OR CONTROL IN CLINICAL RESEARCH: ICD-10-CM

## 2023-10-02 DIAGNOSIS — D86.9 SARCOIDOSIS: Primary | ICD-10-CM

## 2023-10-02 PROCEDURE — 96372 THER/PROPH/DIAG INJ SC/IM: CPT

## 2023-10-02 PROCEDURE — 510N000023 HC CRU B&I PATIENT CARE, PER 15 MIN

## 2023-10-02 PROCEDURE — 96374 THER/PROPH/DIAG INJ IV PUSH: CPT

## 2023-10-02 PROCEDURE — 300N000007 HC RESEARCH B&I SPECIMEN PROCESSING, SIMPLE

## 2023-10-02 PROCEDURE — 510N000009 HC RESEARCH FACILITY, PER 15 MIN

## 2023-10-02 PROCEDURE — 300N000010 HC RESEARCH B&I SPECIMEN PACKAGING, COMPLEX

## 2023-10-02 NOTE — ADDENDUM NOTE
Encounter addended by: Junito Ferreira on: 10/2/2023 2:21 PM   Actions taken: Charge Capture section accepted

## 2023-10-03 ENCOUNTER — MYC MEDICAL ADVICE (OUTPATIENT)
Dept: PULMONOLOGY | Facility: CLINIC | Age: 68
End: 2023-10-03
Payer: COMMERCIAL

## 2023-10-03 NOTE — ADDENDUM NOTE
Encounter addended by: Iggy Sotomayor on: 10/3/2023 2:32 PM   Actions taken: Charge Capture section accepted

## 2023-10-04 LAB
1,25(OH)2D SERPL-MCNC: 32.3 PG/ML (ref 19.9–79.3)
SCANNED LAB RESULT: NORMAL

## 2023-10-30 ENCOUNTER — HOSPITAL ENCOUNTER (OUTPATIENT)
Dept: RESEARCH | Facility: CLINIC | Age: 68
Discharge: HOME | End: 2023-10-30
Attending: INTERNAL MEDICINE | Admitting: INTERNAL MEDICINE
Payer: COMMERCIAL

## 2023-10-30 DIAGNOSIS — D86.9 SARCOIDOSIS: Primary | ICD-10-CM

## 2023-10-30 PROCEDURE — 510N000009 HC RESEARCH FACILITY, PER 15 MIN

## 2023-10-30 PROCEDURE — 96372 THER/PROPH/DIAG INJ SC/IM: CPT

## 2023-10-30 PROCEDURE — 510N000023 HC CRU B&I PATIENT CARE, PER 15 MIN

## 2023-10-30 PROCEDURE — 96374 THER/PROPH/DIAG INJ IV PUSH: CPT

## 2023-10-31 NOTE — ADDENDUM NOTE
Encounter addended by: Iggy Sotomayor on: 10/31/2023 1:20 PM   Actions taken: Charge Capture section accepted

## 2023-11-27 ENCOUNTER — HOSPITAL ENCOUNTER (OUTPATIENT)
Dept: RESEARCH | Facility: CLINIC | Age: 68
Discharge: HOME | End: 2023-11-27
Attending: INTERNAL MEDICINE | Admitting: INTERNAL MEDICINE
Payer: COMMERCIAL

## 2023-11-27 ENCOUNTER — TELEPHONE (OUTPATIENT)
Dept: PULMONOLOGY | Facility: CLINIC | Age: 68
End: 2023-11-27
Payer: COMMERCIAL

## 2023-11-27 VITALS
TEMPERATURE: 98.1 F | RESPIRATION RATE: 18 BRPM | DIASTOLIC BLOOD PRESSURE: 83 MMHG | SYSTOLIC BLOOD PRESSURE: 130 MMHG | HEART RATE: 69 BPM

## 2023-11-27 DIAGNOSIS — Z00.6 EXAMINATION OF PARTICIPANT OR CONTROL IN CLINICAL RESEARCH: Primary | ICD-10-CM

## 2023-11-27 DIAGNOSIS — D86.9 SARCOIDOSIS: ICD-10-CM

## 2023-11-27 LAB
HIV 1+2 AB+HIV1 P24 AG SERPL QL IA: NONREACTIVE
HIV 1+2 AB+HIV1P24 AG SERPLBLD IA.RAPID: NON REACTIVE
HIV 1+2 AB+HIV1P24 AG SERPLBLD IA.RAPID: NON REACTIVE
HIV INTERPRETATION: NORMAL

## 2023-11-27 PROCEDURE — 510N000009 HC RESEARCH FACILITY, PER 15 MIN

## 2023-11-27 PROCEDURE — 999N000104 HEPATITIS C RNA, QUANTITATIVE BY PCR: Performed by: INTERNAL MEDICINE

## 2023-11-27 PROCEDURE — 510N000023 HC CRU B&I PATIENT CARE, PER 15 MIN

## 2023-11-27 PROCEDURE — 36415 COLL VENOUS BLD VENIPUNCTURE: CPT | Performed by: INTERNAL MEDICINE

## 2023-11-27 PROCEDURE — 300N000010 HC RESEARCH B&I SPECIMEN PACKAGING, COMPLEX

## 2023-11-27 PROCEDURE — 300N000007 HC RESEARCH B&I SPECIMEN PROCESSING, SIMPLE

## 2023-11-27 PROCEDURE — 96372 THER/PROPH/DIAG INJ SC/IM: CPT

## 2023-11-27 PROCEDURE — 96374 THER/PROPH/DIAG INJ IV PUSH: CPT

## 2023-11-27 NOTE — ADDENDUM NOTE
Encounter addended by: Junito Ferreira on: 11/27/2023 5:12 PM   Actions taken: Charge Capture section accepted

## 2023-11-27 NOTE — TELEPHONE ENCOUNTER
Left Voicemail (1st Attempt) for the patient to call back and schedule the following:    Appointment type: [facility]  Provider: MICHELE  Return date: 04/2024  Specialty phone number: [phone]  Additional appointment(s) needed: FPFT, 6MWT  Additonal Notes: N/A

## 2023-11-28 LAB
HBV SURFACE AG SERPL QL IA: NONREACTIVE
HCV RNA SERPL NAA+PROBE-ACNC: NOT DETECTED IU/ML

## 2023-11-28 NOTE — ADDENDUM NOTE
Encounter addended by: Iggy Sotomayor on: 11/28/2023 4:44 PM   Actions taken: Charge Capture section accepted

## 2023-11-29 ENCOUNTER — TELEPHONE (OUTPATIENT)
Dept: PULMONOLOGY | Facility: CLINIC | Age: 68
End: 2023-11-29
Payer: COMMERCIAL

## 2023-11-29 NOTE — TELEPHONE ENCOUNTER
Patient Contacted for the patient to call back and schedule the following:    Appointment type: [facility]  Provider: MICHELE  Return date: 04/10/2024  Specialty phone number: [phone]  Additional appointment(s) needed: N/A  Additonal Notes: N/A

## 2023-12-18 ENCOUNTER — HOSPITAL ENCOUNTER (OUTPATIENT)
Dept: RESEARCH | Facility: CLINIC | Age: 68
Discharge: HOME | End: 2023-12-18
Attending: INTERNAL MEDICINE | Admitting: INTERNAL MEDICINE
Payer: COMMERCIAL

## 2023-12-18 PROCEDURE — 510N000009 HC RESEARCH FACILITY, PER 15 MIN

## 2023-12-18 PROCEDURE — 510N000023 HC CRU B&I PATIENT CARE, PER 15 MIN

## 2023-12-18 PROCEDURE — 300N000007 HC RESEARCH B&I SPECIMEN PROCESSING, SIMPLE

## 2023-12-18 PROCEDURE — 300N000010 HC RESEARCH B&I SPECIMEN PACKAGING, COMPLEX

## 2023-12-18 NOTE — ADDENDUM NOTE
Encounter addended by: Junito Ferreira on: 12/18/2023 1:49 PM   Actions taken: Charge Capture section accepted

## 2023-12-19 DIAGNOSIS — Z00.6 RESEARCH STUDY PATIENT: Primary | ICD-10-CM

## 2023-12-28 ENCOUNTER — HOSPITAL ENCOUNTER (OUTPATIENT)
Dept: RESEARCH | Facility: CLINIC | Age: 68
Discharge: HOME | End: 2023-12-28
Attending: INTERNAL MEDICINE | Admitting: INTERNAL MEDICINE
Payer: COMMERCIAL

## 2023-12-28 ENCOUNTER — OFFICE VISIT (OUTPATIENT)
Dept: PULMONOLOGY | Facility: CLINIC | Age: 68
End: 2023-12-28

## 2023-12-28 VITALS
RESPIRATION RATE: 12 BRPM | OXYGEN SATURATION: 98 % | DIASTOLIC BLOOD PRESSURE: 72 MMHG | SYSTOLIC BLOOD PRESSURE: 137 MMHG | HEART RATE: 65 BPM | TEMPERATURE: 97.9 F

## 2023-12-28 DIAGNOSIS — Z00.6 RESEARCH STUDY PATIENT: ICD-10-CM

## 2023-12-28 DIAGNOSIS — D86.9 SARCOIDOSIS: Primary | ICD-10-CM

## 2023-12-28 PROCEDURE — 96374 THER/PROPH/DIAG INJ IV PUSH: CPT

## 2023-12-28 PROCEDURE — 300N000010 HC RESEARCH B&I SPECIMEN PACKAGING, COMPLEX

## 2023-12-28 PROCEDURE — 300N000007 HC RESEARCH B&I SPECIMEN PROCESSING, SIMPLE

## 2023-12-28 PROCEDURE — 94729 DIFFUSING CAPACITY: CPT | Performed by: INTERNAL MEDICINE

## 2023-12-28 PROCEDURE — 510N000009 HC RESEARCH FACILITY, PER 15 MIN

## 2023-12-28 PROCEDURE — 94375 RESPIRATORY FLOW VOLUME LOOP: CPT | Performed by: INTERNAL MEDICINE

## 2023-12-28 PROCEDURE — 510N000023 HC CRU B&I PATIENT CARE, PER 15 MIN

## 2023-12-28 PROCEDURE — 96372 THER/PROPH/DIAG INJ SC/IM: CPT

## 2023-12-28 NOTE — ADDENDUM NOTE
Encounter addended by: Buffy Saleh RN on: 12/28/2023 2:52 PM   Actions taken: Charge Capture section accepted

## 2023-12-29 NOTE — ADDENDUM NOTE
Encounter addended by: Iggy Sotomayor on: 12/29/2023 2:18 PM   Actions taken: Charge Capture section accepted

## 2023-12-30 LAB
DLCOUNC-%PRED-PRE: 73 %
DLCOUNC-PRE: 20 ML/MIN/MMHG
DLCOUNC-PRED: 27.09 ML/MIN/MMHG
ERV-%PRED-PRE: 93 %
ERV-PRE: 1.45 L
ERV-PRED: 1.55 L
EXPTIME-PRE: 11.24 SEC
FEF2575-%PRED-PRE: 48 %
FEF2575-PRE: 1.22 L/SEC
FEF2575-PRED: 2.51 L/SEC
FEFMAX-%PRED-PRE: 77 %
FEFMAX-PRE: 6.83 L/SEC
FEFMAX-PRED: 8.84 L/SEC
FEV1-%PRED-PRE: 92 %
FEV1-PRE: 2.95 L
FEV1FEV6-PRE: 62 %
FEV1FEV6-PRED: 78 %
FEV1FVC-PRE: 56 %
FEV1FVC-PRED: 77 %
FEV1SVC-PRE: 54 %
FEV1SVC-PRED: 73 %
FIFMAX-PRE: 5.68 L/SEC
FVC-%PRED-PRE: 126 %
FVC-PRE: 5.23 L
FVC-PRED: 4.15 L
IC-%PRED-PRE: 128 %
IC-PRE: 3.97 L
IC-PRED: 3.1 L
VA-%PRED-PRE: 101 %
VA-PRE: 6.8 L
VC-%PRED-PRE: 123 %
VC-PRE: 5.42 L
VC-PRED: 4.38 L

## 2024-01-02 ENCOUNTER — HOSPITAL ENCOUNTER (OUTPATIENT)
Dept: RESEARCH | Facility: CLINIC | Age: 69
Discharge: HOME | End: 2024-01-02
Attending: INTERNAL MEDICINE | Admitting: INTERNAL MEDICINE
Payer: COMMERCIAL

## 2024-01-02 PROCEDURE — 510N000009 HC RESEARCH FACILITY, PER 15 MIN

## 2024-01-02 PROCEDURE — 300N000007 HC RESEARCH B&I SPECIMEN PROCESSING, SIMPLE

## 2024-01-02 PROCEDURE — 300N000010 HC RESEARCH B&I SPECIMEN PACKAGING, COMPLEX

## 2024-01-24 ENCOUNTER — HOSPITAL ENCOUNTER (OUTPATIENT)
Dept: RESEARCH | Facility: CLINIC | Age: 69
Discharge: HOME | End: 2024-01-24
Attending: INTERNAL MEDICINE | Admitting: INTERNAL MEDICINE
Payer: COMMERCIAL

## 2024-01-24 DIAGNOSIS — D86.9 SARCOIDOSIS: Primary | ICD-10-CM

## 2024-01-24 PROCEDURE — 510N000009 HC RESEARCH FACILITY, PER 15 MIN

## 2024-01-24 PROCEDURE — 96374 THER/PROPH/DIAG INJ IV PUSH: CPT

## 2024-01-24 PROCEDURE — 96372 THER/PROPH/DIAG INJ SC/IM: CPT

## 2024-01-24 PROCEDURE — 300N000007 HC RESEARCH B&I SPECIMEN PROCESSING, SIMPLE

## 2024-01-24 PROCEDURE — 300N000010 HC RESEARCH B&I SPECIMEN PACKAGING, COMPLEX

## 2024-01-24 PROCEDURE — 510N000023 HC CRU B&I PATIENT CARE, PER 15 MIN

## 2024-01-24 NOTE — ADDENDUM NOTE
Encounter addended by: Junito Ferreira on: 1/24/2024 2:43 PM   Actions taken: Charge Capture section accepted

## 2024-01-25 NOTE — ADDENDUM NOTE
Encounter addended by: Iggy Sotomayor on: 1/25/2024 12:39 PM   Actions taken: Charge Capture section accepted

## 2024-02-20 ENCOUNTER — HOSPITAL ENCOUNTER (OUTPATIENT)
Dept: RESEARCH | Facility: CLINIC | Age: 69
Discharge: HOME | End: 2024-02-20
Attending: INTERNAL MEDICINE | Admitting: INTERNAL MEDICINE
Payer: COMMERCIAL

## 2024-02-20 DIAGNOSIS — Z00.6 RESEARCH SUBJECT: Primary | ICD-10-CM

## 2024-02-20 PROCEDURE — 510N000023 HC CRU B&I PATIENT CARE, PER 15 MIN

## 2024-02-20 PROCEDURE — 510N000009 HC RESEARCH FACILITY, PER 15 MIN

## 2024-02-20 PROCEDURE — 96372 THER/PROPH/DIAG INJ SC/IM: CPT

## 2024-02-20 PROCEDURE — 96374 THER/PROPH/DIAG INJ IV PUSH: CPT

## 2024-02-21 NOTE — ADDENDUM NOTE
Encounter addended by: Iggy Sotomayor on: 2/21/2024 3:27 PM   Actions taken: Charge Capture section accepted

## 2024-03-19 ENCOUNTER — HOSPITAL ENCOUNTER (OUTPATIENT)
Dept: RESEARCH | Facility: CLINIC | Age: 69
Discharge: HOME | End: 2024-03-19
Attending: INTERNAL MEDICINE | Admitting: INTERNAL MEDICINE
Payer: COMMERCIAL

## 2024-03-19 DIAGNOSIS — Z00.6 RESEARCH SUBJECT: Primary | ICD-10-CM

## 2024-03-19 PROCEDURE — 300N000010 HC RESEARCH B&I SPECIMEN PACKAGING, COMPLEX

## 2024-03-19 PROCEDURE — 96374 THER/PROPH/DIAG INJ IV PUSH: CPT

## 2024-03-19 PROCEDURE — 510N000023 HC CRU B&I PATIENT CARE, PER 15 MIN

## 2024-03-19 PROCEDURE — 96372 THER/PROPH/DIAG INJ SC/IM: CPT

## 2024-03-19 PROCEDURE — 300N000007 HC RESEARCH B&I SPECIMEN PROCESSING, SIMPLE

## 2024-03-19 PROCEDURE — 510N000009 HC RESEARCH FACILITY, PER 15 MIN

## 2024-03-19 NOTE — ADDENDUM NOTE
Encounter addended by: Junito Ferreira on: 3/19/2024 1:51 PM   Actions taken: Charge Capture section accepted

## 2024-03-20 NOTE — ADDENDUM NOTE
Encounter addended by: Iggy Sotomayor on: 3/20/2024 9:43 AM   Actions taken: Charge Capture section accepted

## 2024-04-02 ENCOUNTER — HOSPITAL ENCOUNTER (OUTPATIENT)
Dept: RESEARCH | Facility: CLINIC | Age: 69
Discharge: HOME | End: 2024-04-02
Attending: INTERNAL MEDICINE | Admitting: INTERNAL MEDICINE
Payer: COMMERCIAL

## 2024-04-02 PROCEDURE — 510N000023 HC CRU B&I PATIENT CARE, PER 15 MIN

## 2024-04-02 PROCEDURE — 510N000009 HC RESEARCH FACILITY, PER 15 MIN

## 2024-04-10 ENCOUNTER — OFFICE VISIT (OUTPATIENT)
Dept: PULMONOLOGY | Facility: CLINIC | Age: 69
End: 2024-04-10
Attending: INTERNAL MEDICINE
Payer: COMMERCIAL

## 2024-04-10 ENCOUNTER — APPOINTMENT (OUTPATIENT)
Dept: LAB | Facility: CLINIC | Age: 69
End: 2024-04-10
Attending: INTERNAL MEDICINE
Payer: COMMERCIAL

## 2024-04-10 VITALS
HEART RATE: 90 BPM | WEIGHT: 192.3 LBS | DIASTOLIC BLOOD PRESSURE: 80 MMHG | SYSTOLIC BLOOD PRESSURE: 123 MMHG | HEIGHT: 71 IN | BODY MASS INDEX: 26.92 KG/M2 | OXYGEN SATURATION: 96 %

## 2024-04-10 DIAGNOSIS — D86.9 SARCOIDOSIS: Primary | ICD-10-CM

## 2024-04-10 DIAGNOSIS — E83.50 UNSPECIFIED DISORDER OF CALCIUM METABOLISM: ICD-10-CM

## 2024-04-10 DIAGNOSIS — D86.9 SARCOIDOSIS: ICD-10-CM

## 2024-04-10 DIAGNOSIS — Z00.6 RESEARCH STUDY PATIENT: ICD-10-CM

## 2024-04-10 LAB
6 MIN WALK (FT): 1600 FT
6 MIN WALK (M): 488 M
ALBUMIN SERPL BCG-MCNC: 4.2 G/DL (ref 3.5–5.2)
ALP SERPL-CCNC: 100 U/L (ref 40–150)
ALT SERPL W P-5'-P-CCNC: 19 U/L (ref 0–70)
ANION GAP SERPL CALCULATED.3IONS-SCNC: 14 MMOL/L (ref 7–15)
AST SERPL W P-5'-P-CCNC: 28 U/L (ref 0–45)
BASOPHILS # BLD AUTO: 0.1 10E3/UL (ref 0–0.2)
BASOPHILS NFR BLD AUTO: 1 %
BILIRUB SERPL-MCNC: 0.7 MG/DL
BUN SERPL-MCNC: 15 MG/DL (ref 8–23)
CALCIUM SERPL-MCNC: 9.4 MG/DL (ref 8.8–10.2)
CHLORIDE SERPL-SCNC: 104 MMOL/L (ref 98–107)
CREAT SERPL-MCNC: 1.37 MG/DL (ref 0.67–1.17)
DEPRECATED HCO3 PLAS-SCNC: 21 MMOL/L (ref 22–29)
DLCOUNC-%PRED-PRE: 78 %
DLCOUNC-PRE: 21.21 ML/MIN/MMHG
DLCOUNC-PRED: 27.04 ML/MIN/MMHG
EGFRCR SERPLBLD CKD-EPI 2021: 57 ML/MIN/1.73M2
EOSINOPHIL # BLD AUTO: 0.2 10E3/UL (ref 0–0.7)
EOSINOPHIL NFR BLD AUTO: 3 %
ERV-%PRED-PRE: 101 %
ERV-PRE: 1.57 L
ERV-PRED: 1.54 L
ERYTHROCYTE [DISTWIDTH] IN BLOOD BY AUTOMATED COUNT: 13.6 % (ref 10–15)
EXPTIME-PRE: 8.72 SEC
FEF2575-%PRED-PRE: 60 %
FEF2575-PRE: 1.51 L/SEC
FEF2575-PRED: 2.49 L/SEC
FEFMAX-%PRED-PRE: 79 %
FEFMAX-PRE: 6.97 L/SEC
FEFMAX-PRED: 8.81 L/SEC
FEV1-%PRED-PRE: 96 %
FEV1-PRE: 3.07 L
FEV1FEV6-PRE: 64 %
FEV1FEV6-PRED: 78 %
FEV1FVC-PRE: 61 %
FEV1FVC-PRED: 77 %
FEV1SVC-PRE: 60 %
FEV1SVC-PRED: 73 %
FIFMAX-PRE: 5.63 L/SEC
FRCPLETH-%PRED-PRE: 113 %
FRCPLETH-PRE: 4.23 L
FRCPLETH-PRED: 3.74 L
FVC-%PRED-PRE: 121 %
FVC-PRE: 5.04 L
FVC-PRED: 4.14 L
GLUCOSE SERPL-MCNC: 90 MG/DL (ref 70–99)
HCT VFR BLD AUTO: 42.3 % (ref 40–53)
HGB BLD-MCNC: 14.6 G/DL (ref 13.3–17.7)
IC-%PRED-PRE: 114 %
IC-PRE: 3.55 L
IC-PRED: 3.09 L
IMM GRANULOCYTES # BLD: 0 10E3/UL
IMM GRANULOCYTES NFR BLD: 0 %
LYMPHOCYTES # BLD AUTO: 0.8 10E3/UL (ref 0.8–5.3)
LYMPHOCYTES NFR BLD AUTO: 12 %
MCH RBC QN AUTO: 27.9 PG (ref 26.5–33)
MCHC RBC AUTO-ENTMCNC: 34.5 G/DL (ref 31.5–36.5)
MCV RBC AUTO: 81 FL (ref 78–100)
MONOCYTES # BLD AUTO: 0.6 10E3/UL (ref 0–1.3)
MONOCYTES NFR BLD AUTO: 10 %
NEUTROPHILS # BLD AUTO: 5 10E3/UL (ref 1.6–8.3)
NEUTROPHILS NFR BLD AUTO: 74 %
NRBC # BLD AUTO: 0 10E3/UL
NRBC BLD AUTO-RTO: 0 /100
PLATELET # BLD AUTO: 247 10E3/UL (ref 150–450)
POTASSIUM SERPL-SCNC: 4 MMOL/L (ref 3.4–5.3)
PROT SERPL-MCNC: 7.9 G/DL (ref 6.4–8.3)
PTH-INTACT SERPL-MCNC: 59 PG/ML (ref 15–65)
RBC # BLD AUTO: 5.24 10E6/UL (ref 4.4–5.9)
RVPLETH-%PRED-PRE: 101 %
RVPLETH-PRE: 2.66 L
RVPLETH-PRED: 2.62 L
SODIUM SERPL-SCNC: 139 MMOL/L (ref 135–145)
TLCPLETH-%PRED-PRE: 106 %
TLCPLETH-PRE: 7.78 L
TLCPLETH-PRED: 7.33 L
VA-%PRED-PRE: 97 %
VA-PRE: 6.55 L
VC-%PRED-PRE: 116 %
VC-PRE: 5.12 L
VC-PRED: 4.37 L
VIT D+METAB SERPL-MCNC: 26 NG/ML (ref 20–50)
WBC # BLD AUTO: 6.8 10E3/UL (ref 4–11)

## 2024-04-10 PROCEDURE — 99214 OFFICE O/P EST MOD 30 MIN: CPT | Mod: 25 | Performed by: INTERNAL MEDICINE

## 2024-04-10 PROCEDURE — 82306 VITAMIN D 25 HYDROXY: CPT | Performed by: INTERNAL MEDICINE

## 2024-04-10 PROCEDURE — 82164 ANGIOTENSIN I ENZYME TEST: CPT | Mod: 90 | Performed by: PATHOLOGY

## 2024-04-10 PROCEDURE — 94726 PLETHYSMOGRAPHY LUNG VOLUMES: CPT | Performed by: INTERNAL MEDICINE

## 2024-04-10 PROCEDURE — 36415 COLL VENOUS BLD VENIPUNCTURE: CPT | Performed by: PATHOLOGY

## 2024-04-10 PROCEDURE — 83970 ASSAY OF PARATHORMONE: CPT | Performed by: PATHOLOGY

## 2024-04-10 PROCEDURE — 99000 SPECIMEN HANDLING OFFICE-LAB: CPT | Performed by: PATHOLOGY

## 2024-04-10 PROCEDURE — 94729 DIFFUSING CAPACITY: CPT | Performed by: INTERNAL MEDICINE

## 2024-04-10 PROCEDURE — G0463 HOSPITAL OUTPT CLINIC VISIT: HCPCS | Performed by: INTERNAL MEDICINE

## 2024-04-10 PROCEDURE — 94618 PULMONARY STRESS TESTING: CPT | Performed by: INTERNAL MEDICINE

## 2024-04-10 PROCEDURE — 85025 COMPLETE CBC W/AUTO DIFF WBC: CPT | Performed by: PATHOLOGY

## 2024-04-10 PROCEDURE — 93010 ELECTROCARDIOGRAM REPORT: CPT | Mod: GC | Performed by: INTERNAL MEDICINE

## 2024-04-10 PROCEDURE — 93005 ELECTROCARDIOGRAM TRACING: CPT | Mod: RTG

## 2024-04-10 PROCEDURE — 80053 COMPREHEN METABOLIC PANEL: CPT | Performed by: PATHOLOGY

## 2024-04-10 PROCEDURE — 82652 VIT D 1 25-DIHYDROXY: CPT | Performed by: INTERNAL MEDICINE

## 2024-04-10 PROCEDURE — 94375 RESPIRATORY FLOW VOLUME LOOP: CPT | Performed by: INTERNAL MEDICINE

## 2024-04-10 PROCEDURE — 83520 IMMUNOASSAY QUANT NOS NONAB: CPT | Mod: 90 | Performed by: PATHOLOGY

## 2024-04-10 ASSESSMENT — PAIN SCALES - GENERAL: PAINLEVEL: NO PAIN (0)

## 2024-04-10 NOTE — NURSING NOTE
Chief Complaint   Patient presents with    RECHECK     Return Interstitial Lung     Medications reviewed and vital signs taken.   Fausto Rosas, CMA

## 2024-04-10 NOTE — LETTER
4/10/2024         RE: Jose Carlos Tello  [address] [facility] [id number]        Dear Colleague,    Thank you for referring your patient, Jose Carlos Tello, to the [facility] FOR LUNG SCIENCE AND [facility] [place]. Please see a copy of my visit note below.    Reason for Visit  Jose Carlos Tello is a 68 year old year old male who is being seen for RECHECK (Return Interstitial Lung )  Pulmonary HPI    The patient was seen and examined by Naveed Verde MD     Mr. 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  He was last seen in the pulmonary clinic in September 2023.  At that time he was participating in clinical trial RELOLVE -lung testing the efficacy and safety of namilumab versus placebo and pulmonary sarcoidosis.  This agent is an anti-GM-CSF monoclonal antibody.  As a part of the study methotrexate was stopped on June 25.  Study drug was started on June 28.  Prednisone was tapered to off on September 4.  He is currently in the open label extension of the study, where he is receiving the monoclonal antibody.    Today he comes in for routine clinic visit.  He reports minimal pulmonary symptoms.  He has occasional cough with production of clear sputum.  He denies any postnasal drainage.  He has no gastroesophageal reflux.  From endurance standpoint, he believes that his endurance is is better.  He still has some shortness of breath going up inclines.    Current Outpatient Medications   Medication Sig Dispense Refill     calcium carbonate-vitamin D (CALTRATE) 600-10 MG-MCG per tablet Take 1 tablet by mouth daily       FLUoxetine (PROZAC) 20 MG capsule Take 1 Capsule (20 mg) by mouth daily. Take with ONE 40mg capsule, for total daily dose of 60mg.       FLUoxetine (PROZAC) 40 MG capsule Take 1 Capsule (40 mg) by mouth daily. Take ALONG with A 20mg capsule, for total daily dose of 60mg.       MULTIPLE VITAMIN PO Take 1 tablet by mouth daily       simvastatin  (ZOCOR) 40 MG tablet Take 1 tablet by mouth daily at 2 pm       STIOLTO RESPIMAT 2.5-2.5 MCG/ACT AERS INHALE TWO PUFFS BY MOUTH DAILY       study - namilumab VS placebo, IDS# [id number], SOLN SubQ injection Inject 1 mL (150 mg) Subcutaneous once for 1 dose 1 mL 0     Current Facility-Administered Medications   Medication Dose Route Frequency Provider Last Rate Last Admin     study - namilumab VS placebo (IDS# [id number]) SubQ injection 150 mg  150 mg Subcutaneous Once Naveed Verde MD         study - namilumab VS placebo (IDS# [id number]) SubQ injection 150 mg  150 mg Subcutaneous Once Naveed Verde MD         No Known Allergies  History reviewed. No pertinent past medical history.    History reviewed. No pertinent surgical history.    Social History     Socioeconomic History     Marital status: Single     Spouse name: Not on file     Number of children: Not on file     Years of education: Not on file     Highest education level: Not on file   Occupational History     Not on file   Tobacco Use     Smoking status: Never     Smokeless tobacco: Never   Substance and Sexual Activity     Alcohol use: Never     Drug use: Never     Sexual activity: Not on file   Other Topics Concern     Not on file   Social History Narrative     Not on file     Social Determinants of Health     Financial Resource Strain: High Risk (1/1/2022)    [facility]    Financial Resource Strain      Difficulty of Paying Living Expenses: Not on file      Difficulty of Paying Living Expenses: Not on file   Food Insecurity: Not on file   Transportation Needs: Not on file   Physical Activity: Not on file   Stress: Not on file   Social Connections: Unknown (1/1/2022)    Received from [facility]    Social Connections      Frequency of Communication with Friends and Family: Not on file   Interpersonal Safety: Not on file   Housing Stability: Not on file       ROS Pulmonary    A  "complete ROS was otherwise negative except as noted in the HPI.  /80   Pulse 90   Ht 1.803 m (5' 11\")   Wt 87.2 kg (192 lb 4.8 oz)   SpO2 96%   BMI 26.82 kg/m    Exam:   GENERAL APPEARANCE: Alert, and in no apparent distress.  EYES: PERRL, EOMI  HENT: Nasal mucosa with no edema and no hyperemia.   MOUTH: Oral mucosa is moist, without any lesions, no tonsillar enlargement, no oropharyngeal exudate.  NECK: supple, no masses, no thyromegaly.  LYMPHATICS: No significant axillary, cervical, or supraclavicular nodes.  RESP: normal percussion, good air flow throughout.  No crackles. No rhonchi. No wheezes.  CV: Normal S1, S2, regular rhythm, normal rate. No murmur.  No rub. No gallop. No LE edema.   MS: extremities normal. No clubbing. No cyanosis.  SKIN: no rash on limited exam  NEURO: Mentation intact, speech normal, normal strength and tone, normal gait and stance    Results:  PFTs done today were reviewed by me with the patient.  He also had a 6-minute walk test done with a 6-minute walk distance was in normal range.  He walked 1600 feet while the rollabout of normal is 1503 feet.  O2 saturation at the beginning of the walk was 96.  The lowest O2 saturation was 91.  FVC 5.04 L (121%), Z-score +1.48.  FEV1 3.07 (96%), Z-score -0.20.  (FVC ratio 61.  Residual volume 2.66 (101%), Z-score +0.09.  Total lung capacity 7.78 (106%), Z-score +0.64.  DLCO not corrected for hemoglobin is 21.21 (78%), Z-score -1.34.  Monitor patient is that he has obstruction based on decreased FEV1 FVC ratio but his FEV1 is in normal range.  Lung volumes with mildly elevated TLC.  DLCO mildly decreased.  In comparison to prior spirometry, no major change.  No recent TLC but since February 2023, the total lung capacity is decreased by 720 cc.  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  Fungal and AFB stains not done on that tissue.  He was treated with prednisone and methotrexate and enrolled in a clinical trial.  As a part of the study methotrexate was stopped on June 25 2023  Study drug was started on June 28.  Prednisone was tapered to off on September 4.  He is currently in the open label extension of the study, where he is receiving the monoclonal antibody again GMCSF  1.  Pulmonary: Good control of symptoms, now on open label extension of RESOLVE-lung clinical trial.  He is off prednisone and methotrexate.  2.  Extrapulmonary: He follows up with ophthalmology outside [facility].  Right bundle branch block on baseline EKG with negative PET scan for cardiac involvement.  Will repeat an EKG today.  Will check metabolic labs today.  3.  5 point drop in O2 saturation on 6-minute walk test.  This is likely explained by pulm hypertension with RVSP of 38 mmHg plus RA.  No right heart cath in place.  Will continue to monitor  4.  He is on Stiolto for obstructive airways involvement  5.  The patient reports that his grandkids have noticed his lips to be purple.  He is not sure if that is the case.  He is not concerned about..  No hypoxia on 6-minute walk test.  No clear reason for him to have methemoglobinemia.  But if this persists will check methemoglobin levels.  The study finishes in July.  The patient can follow-up with his primary pulmonologist once he finishes the study.  It is unclear if the drug will become available for use subsequently.    Addendum: Labs reviewed. Creatinine is 1.37. Other labs WNL.   This note consists of symbols derived from keyboarding, dictation and/or voice recognition software. As a result, there may be errors in the script that have gone undetected. Please consider this when interpreting information found in this chart              Again, thank you for allowing me to  participate in the care of your patient.        Sincerely,        Naveed Verde MD

## 2024-04-10 NOTE — PROGRESS NOTES
Reason for Visit  Jose Carlos Tello is a 68 year old year old male who is being seen for RECHECK (Return Interstitial Lung )  Pulmonary HPI    The patient was seen and examined by Naveed Verde MD     Mr. 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  He was last seen in the pulmonary clinic in September 2023.  At that time he was participating in clinical trial RELOLVE -lung testing the efficacy and safety of namilumab versus placebo and pulmonary sarcoidosis.  This agent is an anti-GM-CSF monoclonal antibody.  As a part of the study methotrexate was stopped on June 25.  Study drug was started on June 28.  Prednisone was tapered to off on September 4.  He is currently in the open label extension of the study, where he is receiving the monoclonal antibody.    Today he comes in for routine clinic visit.  He reports minimal pulmonary symptoms.  He has occasional cough with production of clear sputum.  He denies any postnasal drainage.  He has no gastroesophageal reflux.  From endurance standpoint, he believes that his endurance is is better.  He still has some shortness of breath going up inclines.    Current Outpatient Medications   Medication Sig Dispense Refill    calcium carbonate-vitamin D (CALTRATE) 600-10 MG-MCG per tablet Take 1 tablet by mouth daily      FLUoxetine (PROZAC) 20 MG capsule Take 1 Capsule (20 mg) by mouth daily. Take with ONE 40mg capsule, for total daily dose of 60mg.      FLUoxetine (PROZAC) 40 MG capsule Take 1 Capsule (40 mg) by mouth daily. Take ALONG with A 20mg capsule, for total daily dose of 60mg.      MULTIPLE VITAMIN PO Take 1 tablet by mouth daily      simvastatin (ZOCOR) 40 MG tablet Take 1 tablet by mouth daily at 2 pm      STIOLTO RESPIMAT 2.5-2.5 MCG/ACT AERS INHALE TWO PUFFS BY MOUTH DAILY      study - namilumab VS placebo, IDS# [id number], SOLN SubQ injection Inject 1 mL (150 mg) Subcutaneous once for 1 dose 1 mL 0     Current Facility-Administered Medications  "  Medication Dose Route Frequency Provider Last Rate Last Admin    study - namilumab VS placebo (IDS# [id number]) SubQ injection 150 mg  150 mg Subcutaneous Once Naveed Verde MD        study - namilumab VS placebo (IDS# [id number]) SubQ injection 150 mg  150 mg Subcutaneous Once Naveed Verde MD         No Known Allergies  History reviewed. No pertinent past medical history.    History reviewed. No pertinent surgical history.    Social History     Socioeconomic History    Marital status: Single     Spouse name: Not on file    Number of children: Not on file    Years of education: Not on file    Highest education level: Not on file   Occupational History    Not on file   Tobacco Use    Smoking status: Never    Smokeless tobacco: Never   Substance and Sexual Activity    Alcohol use: Never    Drug use: Never    Sexual activity: Not on file   Other Topics Concern    Not on file   Social History Narrative    Not on file     Social Determinants of Health     Financial Resource Strain: High Risk (1/1/2022)    Received from [facility] [employer]    Financial Resource Strain     Difficulty of Paying Living Expenses: Not on file     Difficulty of Paying Living Expenses: Not on file   Food Insecurity: Not on file   Transportation Needs: Not on file   Physical Activity: Not on file   Stress: Not on file   Social Connections: Unknown (1/1/2022)    Received from [facility] [employer]    Social Connections     Frequency of Communication with Friends and Family: Not on file   Interpersonal Safety: Not on file   Housing Stability: Not on file       ROS Pulmonary    A complete ROS was otherwise negative except as noted in the HPI.  /80   Pulse 90   Ht 1.803 m (5' 11\")   Wt 87.2 kg (192 lb 4.8 oz)   SpO2 96%   BMI 26.82 kg/m    Exam:   GENERAL APPEARANCE: Alert, and in no apparent distress.  EYES: PERRL, EOMI  HENT: Nasal mucosa with no edema and no hyperemia.   MOUTH: Oral " mucosa is moist, without any lesions, no tonsillar enlargement, no oropharyngeal exudate.  NECK: supple, no masses, no thyromegaly.  LYMPHATICS: No significant axillary, cervical, or supraclavicular nodes.  RESP: normal percussion, good air flow throughout.  No crackles. No rhonchi. No wheezes.  CV: Normal S1, S2, regular rhythm, normal rate. No murmur.  No rub. No gallop. No LE edema.   MS: extremities normal. No clubbing. No cyanosis.  SKIN: no rash on limited exam  NEURO: Mentation intact, speech normal, normal strength and tone, normal gait and stance    Results:  PFTs done today were reviewed by me with the patient.  He also had a 6-minute walk test done with a 6-minute walk distance was in normal range.  He walked 1600 feet while the rollabout of normal is 1503 feet.  O2 saturation at the beginning of the walk was 96.  The lowest O2 saturation was 91.  FVC 5.04 L (121%), Z-score +1.48.  FEV1 3.07 (96%), Z-score -0.20.  (FVC ratio 61.  Residual volume 2.66 (101%), Z-score +0.09.  Total lung capacity 7.78 (106%), Z-score +0.64.  DLCO not corrected for hemoglobin is 21.21 (78%), Z-score -1.34.  Monitor patient is that he has obstruction based on decreased FEV1 FVC ratio but his FEV1 is in normal range.  Lung volumes with mildly elevated TLC.  DLCO mildly decreased.  In comparison to prior spirometry, no major change.  No recent TLC but since February 2023, the total lung capacity is decreased by 720 cc.  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  Fungal and AFB stains not done on that tissue.  He was treated with prednisone and methotrexate and enrolled in a clinical trial.  As a part of the study methotrexate was stopped on June 25 2023  Study drug was started on June 28.  Prednisone  was tapered to off on September 4.  He is currently in the open label extension of the study, where he is receiving the monoclonal antibody again GMCSF  1.  Pulmonary: Good control of symptoms, now on open label extension of RESOLVE-lung clinical trial.  He is off prednisone and methotrexate.  2.  Extrapulmonary: He follows up with ophthalmology outside [facility].  Right bundle branch block on baseline EKG with negative PET scan for cardiac involvement.  Will repeat an EKG today.  Will check metabolic labs today.  3.  5 point drop in O2 saturation on 6-minute walk test.  This is likely explained by pulm hypertension with RVSP of 38 mmHg plus RA.  No right heart cath in place.  Will continue to monitor  4.  He is on Stiolto for obstructive airways involvement  5.  The patient reports that his grandkids have noticed his lips to be purple.  He is not sure if that is the case.  He is not concerned about..  No hypoxia on 6-minute walk test.  No clear reason for him to have methemoglobinemia.  But if this persists will check methemoglobin levels.  The study finishes in July.  The patient can follow-up with his primary pulmonologist once he finishes the study.  It is unclear if the drug will become available for use subsequently.    Addendum: Labs reviewed. Creatinine is 1.37. Other labs WNL.   This note consists of symbols derived from keyboarding, dictation and/or voice recognition software. As a result, there may be errors in the script that have gone undetected. Please consider this when interpreting information found in this chart

## 2024-04-11 LAB
1,25(OH)2D SERPL-MCNC: 34.7 PG/ML (ref 19.9–79.3)
ATRIAL RATE - MUSE: 76 BPM
DIASTOLIC BLOOD PRESSURE - MUSE: NORMAL MMHG
INTERPRETATION ECG - MUSE: NORMAL
P AXIS - MUSE: 67 DEGREES
PR INTERVAL - MUSE: 150 MS
QRS DURATION - MUSE: 142 MS
QT - MUSE: 456 MS
QTC - MUSE: 513 MS
R AXIS - MUSE: 81 DEGREES
SYSTOLIC BLOOD PRESSURE - MUSE: NORMAL MMHG
T AXIS - MUSE: 38 DEGREES
VENTRICULAR RATE- MUSE: 76 BPM

## 2024-04-12 LAB
ACE SERPL-CCNC: 79 U/L
SOL IL2 RECEP SERPL-MCNC: 1848 PG/ML

## 2024-04-17 ENCOUNTER — HOSPITAL ENCOUNTER (OUTPATIENT)
Dept: RESEARCH | Facility: CLINIC | Age: 69
Discharge: HOME | End: 2024-04-17
Attending: INTERNAL MEDICINE | Admitting: INTERNAL MEDICINE
Payer: COMMERCIAL

## 2024-04-17 DIAGNOSIS — Z00.6 RESEARCH STUDY PATIENT: Primary | ICD-10-CM

## 2024-04-17 PROCEDURE — 96372 THER/PROPH/DIAG INJ SC/IM: CPT

## 2024-04-17 PROCEDURE — 510N000023 HC CRU B&I PATIENT CARE, PER 15 MIN

## 2024-04-17 PROCEDURE — 510N000009 HC RESEARCH FACILITY, PER 15 MIN

## 2024-04-17 PROCEDURE — 96374 THER/PROPH/DIAG INJ IV PUSH: CPT

## 2024-04-18 NOTE — ADDENDUM NOTE
Encounter addended by: Iggy Sotomayor on: 4/18/2024 11:23 AM   Actions taken: Charge Capture section accepted

## 2024-05-13 ENCOUNTER — HOSPITAL ENCOUNTER (OUTPATIENT)
Dept: RESEARCH | Facility: CLINIC | Age: 69
Discharge: HOME | End: 2024-05-13
Attending: INTERNAL MEDICINE | Admitting: INTERNAL MEDICINE
Payer: COMMERCIAL

## 2024-05-13 DIAGNOSIS — Z00.6 RESEARCH STUDY PATIENT: Primary | ICD-10-CM

## 2024-05-13 PROCEDURE — 96372 THER/PROPH/DIAG INJ SC/IM: CPT

## 2024-05-13 PROCEDURE — 510N000009 HC RESEARCH FACILITY, PER 15 MIN

## 2024-05-13 PROCEDURE — 96374 THER/PROPH/DIAG INJ IV PUSH: CPT

## 2024-05-13 PROCEDURE — 510N000023 HC CRU B&I PATIENT CARE, PER 15 MIN

## 2024-05-13 PROCEDURE — 300N000007 HC RESEARCH B&I SPECIMEN PROCESSING, SIMPLE

## 2024-05-13 PROCEDURE — 300N000010 HC RESEARCH B&I SPECIMEN PACKAGING, COMPLEX

## 2024-05-13 NOTE — ADDENDUM NOTE
Encounter addended by: Junito Ferreira on: 5/13/2024 4:05 PM   Actions taken: Charge Capture section accepted

## 2024-05-14 NOTE — ADDENDUM NOTE
Encounter addended by: Iggy Sotomayor on: 5/14/2024 10:08 AM   Actions taken: Charge Capture section accepted

## 2024-05-29 ENCOUNTER — HOSPITAL ENCOUNTER (OUTPATIENT)
Dept: RESEARCH | Facility: CLINIC | Age: 69
Discharge: HOME | End: 2024-05-29
Attending: INTERNAL MEDICINE | Admitting: INTERNAL MEDICINE
Payer: COMMERCIAL

## 2024-05-29 PROCEDURE — 510N000009 HC RESEARCH FACILITY, PER 15 MIN

## 2024-05-29 PROCEDURE — 300N000010 HC RESEARCH B&I SPECIMEN PACKAGING, COMPLEX

## 2024-05-29 PROCEDURE — 510N000017 HC CRU PATIENT CARE, PER 15 MIN

## 2024-05-29 PROCEDURE — 300N000007 HC RESEARCH B&I SPECIMEN PROCESSING, SIMPLE

## 2024-06-11 ENCOUNTER — HOSPITAL ENCOUNTER (OUTPATIENT)
Dept: RESEARCH | Facility: CLINIC | Age: 69
Discharge: HOME | End: 2024-06-11
Attending: INTERNAL MEDICINE | Admitting: INTERNAL MEDICINE
Payer: COMMERCIAL

## 2024-06-11 DIAGNOSIS — Z00.6 RESEARCH SUBJECT: Primary | ICD-10-CM

## 2024-06-11 PROCEDURE — 510N000009 HC RESEARCH FACILITY, PER 15 MIN

## 2024-06-11 PROCEDURE — 510N000017 HC CRU PATIENT CARE, PER 15 MIN

## 2024-06-23 ENCOUNTER — HEALTH MAINTENANCE LETTER (OUTPATIENT)
Age: 69
End: 2024-06-23

## 2024-07-03 DIAGNOSIS — D86.9 SARCOIDOSIS: Primary | ICD-10-CM

## 2024-07-09 ENCOUNTER — OFFICE VISIT (OUTPATIENT)
Dept: PULMONOLOGY | Facility: CLINIC | Age: 69
End: 2024-07-09
Payer: COMMERCIAL

## 2024-07-09 ENCOUNTER — HOSPITAL ENCOUNTER (OUTPATIENT)
Dept: RESEARCH | Facility: CLINIC | Age: 69
Discharge: HOME | End: 2024-07-09
Attending: INTERNAL MEDICINE
Payer: COMMERCIAL

## 2024-07-09 VITALS
TEMPERATURE: 97.9 F | RESPIRATION RATE: 20 BRPM | BODY MASS INDEX: 26.69 KG/M2 | SYSTOLIC BLOOD PRESSURE: 120 MMHG | DIASTOLIC BLOOD PRESSURE: 75 MMHG | WEIGHT: 191.36 LBS | HEART RATE: 60 BPM

## 2024-07-09 DIAGNOSIS — D86.9 SARCOIDOSIS: ICD-10-CM

## 2024-07-09 PROCEDURE — 94729 DIFFUSING CAPACITY: CPT | Performed by: INTERNAL MEDICINE

## 2024-07-09 PROCEDURE — 300N000007 HC RESEARCH B&I SPECIMEN PROCESSING, SIMPLE

## 2024-07-09 PROCEDURE — 510N000009 HC RESEARCH FACILITY, PER 15 MIN

## 2024-07-09 PROCEDURE — 510N000017 HC CRU PATIENT CARE, PER 15 MIN

## 2024-07-09 PROCEDURE — 94375 RESPIRATORY FLOW VOLUME LOOP: CPT | Performed by: INTERNAL MEDICINE

## 2024-07-09 PROCEDURE — 300N000010 HC RESEARCH B&I SPECIMEN PACKAGING, COMPLEX

## 2024-07-09 NOTE — ADDENDUM NOTE
Encounter addended by: Junito Ferreira on: 7/9/2024 3:33 PM   Actions taken: Charge Capture section accepted

## 2024-07-10 LAB
DLCOUNC-%PRED-PRE: 71 %
DLCOUNC-PRE: 19.26 ML/MIN/MMHG
DLCOUNC-PRED: 27 ML/MIN/MMHG
ERV-%PRED-PRE: 61 %
ERV-PRE: 0.95 L
ERV-PRED: 1.54 L
EXPTIME-PRE: 13.24 SEC
FEF2575-%PRED-PRE: 55 %
FEF2575-PRE: 1.39 L/SEC
FEF2575-PRED: 2.48 L/SEC
FEFMAX-%PRED-PRE: 84 %
FEFMAX-PRE: 7.45 L/SEC
FEFMAX-PRED: 8.79 L/SEC
FEV1-%PRED-PRE: 96 %
FEV1-PRE: 3.05 L
FEV1FEV6-PRE: 66 %
FEV1FEV6-PRED: 78 %
FEV1FVC-PRE: 60 %
FEV1FVC-PRED: 77 %
FEV1SVC-PRE: 57 %
FEV1SVC-PRED: 72 %
FIFMAX-PRE: 4.94 L/SEC
FVC-%PRED-PRE: 123 %
FVC-PRE: 5.11 L
FVC-PRED: 4.13 L
IC-%PRED-PRE: 142 %
IC-PRE: 4.4 L
IC-PRED: 3.08 L
VA-%PRED-PRE: 99 %
VA-PRE: 6.65 L
VC-%PRED-PRE: 122 %
VC-PRE: 5.35 L
VC-PRED: 4.37 L

## 2024-10-14 ENCOUNTER — HOSPITAL ENCOUNTER (OUTPATIENT)
Dept: RESEARCH | Facility: CLINIC | Age: 69
Discharge: HOME | End: 2024-10-14
Attending: INTERNAL MEDICINE | Admitting: INTERNAL MEDICINE
Payer: COMMERCIAL

## 2024-10-14 PROCEDURE — 300N000010 HC RESEARCH B&I SPECIMEN PACKAGING, COMPLEX

## 2024-10-14 PROCEDURE — 300N000007 HC RESEARCH B&I SPECIMEN PROCESSING, SIMPLE

## 2024-10-14 PROCEDURE — 510N000023 HC CRU B&I PATIENT CARE, PER 15 MIN

## 2024-10-14 PROCEDURE — 510N000009 HC RESEARCH FACILITY, PER 15 MIN

## 2024-10-14 NOTE — ADDENDUM NOTE
Encounter addended by: Junito Ferreira on: 10/14/2024 2:28 PM   Actions taken: Charge Capture section accepted

## 2025-07-12 ENCOUNTER — HEALTH MAINTENANCE LETTER (OUTPATIENT)
Age: 70
End: 2025-07-12